# Patient Record
Sex: FEMALE | Race: WHITE | NOT HISPANIC OR LATINO | Employment: FULL TIME | URBAN - METROPOLITAN AREA
[De-identification: names, ages, dates, MRNs, and addresses within clinical notes are randomized per-mention and may not be internally consistent; named-entity substitution may affect disease eponyms.]

---

## 2017-12-14 ENCOUNTER — HOSPITAL ENCOUNTER (OUTPATIENT)
Dept: RADIOLOGY | Facility: HOSPITAL | Age: 77
Discharge: HOME | End: 2017-12-14
Attending: FAMILY MEDICINE
Payer: COMMERCIAL

## 2017-12-14 VITALS — WEIGHT: 200 LBS | BODY MASS INDEX: 37.76 KG/M2 | HEIGHT: 61 IN

## 2017-12-14 DIAGNOSIS — N63.22 BREAST LUMP ON LEFT SIDE AT 10 O'CLOCK POSITION: ICD-10-CM

## 2017-12-14 PROCEDURE — 76641 ULTRASOUND BREAST COMPLETE: CPT | Mod: TC,LT

## 2017-12-14 PROCEDURE — 77062 BREAST TOMOSYNTHESIS BI: CPT | Mod: 26,,, | Performed by: RADIOLOGY

## 2017-12-14 PROCEDURE — 77066 DX MAMMO INCL CAD BI: CPT | Mod: 26,,, | Performed by: RADIOLOGY

## 2017-12-14 PROCEDURE — 77062 BREAST TOMOSYNTHESIS BI: CPT | Mod: TC

## 2017-12-14 PROCEDURE — 76641 ULTRASOUND BREAST COMPLETE: CPT | Mod: 26,LT,, | Performed by: RADIOLOGY

## 2018-02-22 ENCOUNTER — HOSPITAL ENCOUNTER (OUTPATIENT)
Dept: RADIOLOGY | Facility: HOSPITAL | Age: 78
Discharge: HOME | End: 2018-02-22
Attending: FAMILY MEDICINE
Payer: COMMERCIAL

## 2018-02-22 DIAGNOSIS — Z78.0 POST-MENOPAUSAL: ICD-10-CM

## 2018-02-22 PROCEDURE — 77080 DXA BONE DENSITY AXIAL: CPT | Mod: TC

## 2018-02-22 PROCEDURE — 77080 DXA BONE DENSITY AXIAL: CPT | Mod: 26,,, | Performed by: RADIOLOGY

## 2018-04-09 ENCOUNTER — HOSPITAL ENCOUNTER (OUTPATIENT)
Dept: RADIOLOGY | Facility: HOSPITAL | Age: 78
Discharge: HOME | End: 2018-04-09
Attending: FAMILY MEDICINE
Payer: COMMERCIAL

## 2018-04-09 DIAGNOSIS — E04.1 NONTOXIC SINGLE THYROID NODULE: ICD-10-CM

## 2018-04-09 PROCEDURE — A9516 IODINE I-123 SOD IODIDE MIC: HCPCS

## 2018-04-09 PROCEDURE — 78014 THYROID IMAGING W/BLOOD FLOW: CPT | Mod: 26,,, | Performed by: RADIOLOGY

## 2018-04-10 ENCOUNTER — HOSPITAL ENCOUNTER (OUTPATIENT)
Dept: RADIOLOGY | Facility: HOSPITAL | Age: 78
Discharge: HOME | End: 2018-04-10
Attending: FAMILY MEDICINE
Payer: COMMERCIAL

## 2018-05-02 ENCOUNTER — HOSPITAL ENCOUNTER (OUTPATIENT)
Dept: RADIOLOGY | Facility: HOSPITAL | Age: 78
Discharge: HOME | End: 2018-05-02
Attending: FAMILY MEDICINE
Payer: COMMERCIAL

## 2018-05-02 DIAGNOSIS — E78.5 HYPERLIPIDEMIA: ICD-10-CM

## 2018-05-02 DIAGNOSIS — E08.9 DIABETES MELLITUS DUE TO UNDERLYING CONDITION: ICD-10-CM

## 2018-05-02 DIAGNOSIS — M54.50 LOW BACK PAIN: ICD-10-CM

## 2018-05-02 DIAGNOSIS — I10 HYPERTENSION GOAL BP (BLOOD PRESSURE) < 130/80: Primary | ICD-10-CM

## 2018-05-02 PROCEDURE — 25500020 PHARM REV CODE 255

## 2018-05-02 PROCEDURE — 74177 CT ABD & PELVIS W/CONTRAST: CPT | Mod: 26,,, | Performed by: RADIOLOGY

## 2018-05-02 PROCEDURE — 74177 CT ABD & PELVIS W/CONTRAST: CPT | Mod: TC

## 2018-05-02 RX ADMIN — IOHEXOL 30 ML: 350 INJECTION, SOLUTION INTRAVENOUS at 08:05

## 2018-05-02 RX ADMIN — IOHEXOL 100 ML: 350 INJECTION, SOLUTION INTRAVENOUS at 10:05

## 2018-10-15 ENCOUNTER — HOSPITAL ENCOUNTER (OUTPATIENT)
Dept: RADIOLOGY | Facility: HOSPITAL | Age: 78
Discharge: HOME | End: 2018-10-15
Attending: FAMILY MEDICINE
Payer: COMMERCIAL

## 2018-10-15 DIAGNOSIS — R10.33 PERIUMBILICAL PAIN: ICD-10-CM

## 2018-10-15 DIAGNOSIS — R51.9 HEADACHE: ICD-10-CM

## 2018-10-15 PROCEDURE — 70450 CT HEAD/BRAIN W/O DYE: CPT | Mod: TC

## 2018-10-15 PROCEDURE — 70450 CT HEAD/BRAIN W/O DYE: CPT | Mod: 26,,, | Performed by: RADIOLOGY

## 2018-10-15 PROCEDURE — 76700 US EXAM ABDOM COMPLETE: CPT | Mod: 26,,, | Performed by: RADIOLOGY

## 2018-10-15 PROCEDURE — 76700 US EXAM ABDOM COMPLETE: CPT | Mod: TC

## 2018-11-06 ENCOUNTER — OFFICE VISIT (OUTPATIENT)
Dept: NEUROLOGY | Facility: CLINIC | Age: 78
End: 2018-11-06
Payer: COMMERCIAL

## 2018-11-06 VITALS
BODY MASS INDEX: 49.05 KG/M2 | SYSTOLIC BLOOD PRESSURE: 116 MMHG | HEART RATE: 70 BPM | RESPIRATION RATE: 18 BRPM | HEIGHT: 58 IN | WEIGHT: 233.69 LBS | DIASTOLIC BLOOD PRESSURE: 70 MMHG

## 2018-11-06 DIAGNOSIS — I10 HYPERTENSION GOAL BP (BLOOD PRESSURE) < 130/80: ICD-10-CM

## 2018-11-06 DIAGNOSIS — R93.0 ABNORMAL CT OF THE HEAD: Primary | ICD-10-CM

## 2018-11-06 DIAGNOSIS — E78.2 MIXED HYPERLIPIDEMIA: ICD-10-CM

## 2018-11-06 PROCEDURE — 3078F DIAST BP <80 MM HG: CPT | Mod: CPTII,S$GLB,, | Performed by: PSYCHIATRY & NEUROLOGY

## 2018-11-06 PROCEDURE — 99204 OFFICE O/P NEW MOD 45 MIN: CPT | Mod: S$GLB,,, | Performed by: PSYCHIATRY & NEUROLOGY

## 2018-11-06 PROCEDURE — 99999 PR PBB SHADOW E&M-EST. PATIENT-LVL III: CPT | Mod: PBBFAC,,, | Performed by: PSYCHIATRY & NEUROLOGY

## 2018-11-06 PROCEDURE — 3074F SYST BP LT 130 MM HG: CPT | Mod: CPTII,S$GLB,, | Performed by: PSYCHIATRY & NEUROLOGY

## 2018-11-06 RX ORDER — ASPIRIN AND DIPYRIDAMOLE 25; 200 MG/1; MG/1
1 CAPSULE, EXTENDED RELEASE ORAL 2 TIMES DAILY
COMMUNITY
End: 2019-06-05

## 2018-11-06 RX ORDER — PYRIDOXINE HCL (VITAMIN B6) 50 MG
50 TABLET ORAL DAILY
Refills: 4 | COMMUNITY
Start: 2018-10-13

## 2018-11-06 RX ORDER — GLYBURIDE 1.25 MG/1
1.25 TABLET ORAL DAILY
Refills: 1 | COMMUNITY
Start: 2018-09-18

## 2018-11-06 RX ORDER — LEVOTHYROXINE SODIUM 150 UG/1
150 TABLET ORAL DAILY
Refills: 0 | COMMUNITY
Start: 2018-10-13

## 2018-11-06 RX ORDER — HYDROCHLOROTHIAZIDE 25 MG/1
1 TABLET ORAL DAILY
COMMUNITY

## 2018-11-06 NOTE — PROGRESS NOTES
"Consult from Dr CLAU Gamez    HPI: Aurora Go is a 77 y.o. female sent for evaluation of "cerebral ischemia" which was found by a CT scan done in 10/2018.  This was done due to a severe at that time in her which was unusual- she has not prior history of severe headaches.  She had head pain on right temple and bilateral occiput which lasted for several hours and was severe and required her to rest and persisted to the next day. She had no weakness or numbness. She is a Diabetic with HTN and dyslipidemia.   Headache resolved but she had CT head with PCP.  There is not family history of aneurysm.   She has a history of aortic valve repair with pig valve.   She was taking an 81mg ASA daily prior to this spell.   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 She lives in [place].     Review of Systems   HENT: Negative for nosebleeds.    Eyes: Negative for double vision.   Genitourinary: Negative for hematuria.   Neurological: Negative for seizures.   Psychiatric/Behavioral: The patient does not have insomnia.          I have reviewed all of this patient's past medical and surgical histories as well as family and social histories and active allergies and medications as documented in the electronic medical record.        Exam:  Gen Appearance, well developed/nourished in no apparent distress  CV: 2+ distal pulses with no edema or swelling  Neuro:  MS: Awake, alert, oriented to place, person, time, situation. Sustains attention. Recent/remote memory intact, Language is full to spontaneous speech/repetition/naming/comprehension. Fund of Knowledge is full  CN: Optic discs are flat with normal vasculature, PERRL, Extraoccular movements and visual fields are full. Normal facial sensation and strength, Hearing symmetric, Tongue and Palate are midline and strong. Shoulder Shrug symmetric and strong.  Motor: Normal bulk, tone, no abnormal movements. 5/5 strength bilateral upper/lower extremities " "with 2+ reflexes and bilateral plantar response  Sensory: symmetric to light touch, pain, temp, and vibration/proprioception. Romberg negative  Cerebellar: Finger-nose,Heal-shin, Rapid alternating movements intact  Gait: Normal stance, no ataxia    Imaging: 10/2015 CT head: Scattered remote areas of gliosis supratentorial both hemispheres, microvascular ischemic change favored.  No acute process or hemorrhage or subdural fluid or mass lesions.          Assessment/Plan: Aurora Go is a 77 y.o. female who had a headache in 10/2018 which is unusual for her. CT head showed "remote areas of gliosis" favoring microvascular ischemia changes.   I recommend:   1. MRI brain needed to further evaluate the above changes given her headache  2. Monitor for any recurrence headache.   3. If MRI does not show any acute changes, the white matter changes are likely chronic ischemic changes related to her HTN, DM2, and   Dyslipidemia. She was taking ASA daily prior to this headache. If there are no acute change by MRI, ASA 81mg could be continued (but she can be escalated to aggrenox and will need further work up if there is an acute CVA). For stroke prevention: Goal A1C is less than 7, goal LDL is less than 70 and goal BP is less than 130/90  Note: She has a history of aortic valve repair with pig valve.   The patient was asked to call for the above MRI results and notify me if any further headaches.   RTC given otherwise.   CC: Dr CLAU Gamez        "

## 2018-11-06 NOTE — LETTER
November 6, 2018      Clay Gamez MD  [address] [facility]  [place] [address]           [facility]  [address] [facility] [address]  Phone: [phone]  Fax: [phone]          Patient: Aurora Go   MR Number: [id number]   YOB: 1940   Date of Visit: 11/6/2018       Dear Dr. Clay Gamez:    Thank you for referring Aurora Go to me for evaluation. Attached you will find relevant portions of my assessment and plan of care.    If you have questions, please do not hesitate to call me. I look forward to following Aurora Go along with you.    Sincerely,    Henry Wise MD    Enclosure  CC:  No Recipients    If you would like to receive this communication electronically, please contact [email or web address] or [phone] to request more information on [employer] access.    For providers and/or their staff who would like to refer a patient to [facility], please contact us through our one-stop-shop provider referral line, [facility], at [phone].    If you feel you have received this communication in error or would no longer like to receive these types of communications, please e-mail [email or web address]

## 2018-11-13 ENCOUNTER — HOSPITAL ENCOUNTER (OUTPATIENT)
Dept: RADIOLOGY | Facility: HOSPITAL | Age: 78
Discharge: HOME | End: 2018-11-13
Attending: PSYCHIATRY & NEUROLOGY
Payer: COMMERCIAL

## 2018-11-13 DIAGNOSIS — R93.0 ABNORMAL CT OF THE HEAD: ICD-10-CM

## 2018-11-13 PROCEDURE — 70551 MRI BRAIN STEM W/O DYE: CPT | Mod: 26,,, | Performed by: RADIOLOGY

## 2018-11-13 PROCEDURE — 70551 MRI BRAIN STEM W/O DYE: CPT | Mod: TC

## 2019-05-02 ENCOUNTER — TELEPHONE (OUTPATIENT)
Dept: NEUROLOGY | Facility: CLINIC | Age: 79
End: 2019-05-02

## 2019-05-02 NOTE — TELEPHONE ENCOUNTER
Reviewed records sent by cardiology. Patient's updated Carotid US (less than 40% stenosis), 9 day event monitor (NSR), and Echo (normal EF) from cardiology in 2019 is reviewed and filed in the chart  Labs 2019: Normal A1C and LDL is 116

## 2019-06-05 ENCOUNTER — OFFICE VISIT (OUTPATIENT)
Dept: NEUROLOGY | Facility: CLINIC | Age: 79
End: 2019-06-05
Payer: COMMERCIAL

## 2019-06-05 VITALS
HEIGHT: 58 IN | DIASTOLIC BLOOD PRESSURE: 84 MMHG | BODY MASS INDEX: 50.17 KG/M2 | WEIGHT: 239 LBS | SYSTOLIC BLOOD PRESSURE: 128 MMHG | HEART RATE: 64 BPM | RESPIRATION RATE: 18 BRPM

## 2019-06-05 DIAGNOSIS — E03.9 HYPOTHYROIDISM, UNSPECIFIED TYPE: ICD-10-CM

## 2019-06-05 DIAGNOSIS — E78.2 MIXED HYPERLIPIDEMIA: ICD-10-CM

## 2019-06-05 DIAGNOSIS — R25.1 TREMOR OF LEFT HAND: ICD-10-CM

## 2019-06-05 DIAGNOSIS — R26.9 GAIT ABNORMALITY: ICD-10-CM

## 2019-06-05 DIAGNOSIS — R42 LIGHTHEADEDNESS: Primary | ICD-10-CM

## 2019-06-05 DIAGNOSIS — R51.9 HEADACHE, UNSPECIFIED HEADACHE TYPE: ICD-10-CM

## 2019-06-05 DIAGNOSIS — R53.83 FATIGUE, UNSPECIFIED TYPE: ICD-10-CM

## 2019-06-05 DIAGNOSIS — I63.81 LACUNAR INFARCTION: ICD-10-CM

## 2019-06-05 DIAGNOSIS — I10 HYPERTENSION GOAL BP (BLOOD PRESSURE) < 130/80: ICD-10-CM

## 2019-06-05 DIAGNOSIS — R20.2 FACIAL PARESTHESIA: ICD-10-CM

## 2019-06-05 DIAGNOSIS — H53.2 DIPLOPIA: ICD-10-CM

## 2019-06-05 PROCEDURE — 99215 PR OFFICE/OUTPT VISIT, EST, LEVL V, 40-54 MIN: ICD-10-PCS | Mod: S$GLB,,, | Performed by: NURSE PRACTITIONER

## 2019-06-05 PROCEDURE — 3079F PR MOST RECENT DIASTOLIC BLOOD PRESSURE 80-89 MM HG: ICD-10-PCS | Mod: CPTII,S$GLB,, | Performed by: NURSE PRACTITIONER

## 2019-06-05 PROCEDURE — 99215 OFFICE O/P EST HI 40 MIN: CPT | Mod: S$GLB,,, | Performed by: NURSE PRACTITIONER

## 2019-06-05 PROCEDURE — 99999 PR PBB SHADOW E&M-EST. PATIENT-LVL IV: CPT | Mod: PBBFAC,,, | Performed by: NURSE PRACTITIONER

## 2019-06-05 PROCEDURE — 3074F PR MOST RECENT SYSTOLIC BLOOD PRESSURE < 130 MM HG: ICD-10-PCS | Mod: CPTII,S$GLB,, | Performed by: NURSE PRACTITIONER

## 2019-06-05 PROCEDURE — 3074F SYST BP LT 130 MM HG: CPT | Mod: CPTII,S$GLB,, | Performed by: NURSE PRACTITIONER

## 2019-06-05 PROCEDURE — 3079F DIAST BP 80-89 MM HG: CPT | Mod: CPTII,S$GLB,, | Performed by: NURSE PRACTITIONER

## 2019-06-05 PROCEDURE — 99999 PR PBB SHADOW E&M-EST. PATIENT-LVL IV: ICD-10-PCS | Mod: PBBFAC,,, | Performed by: NURSE PRACTITIONER

## 2019-06-05 RX ORDER — RAMIPRIL 5 MG/1
5 CAPSULE ORAL DAILY
Refills: 0 | COMMUNITY
Start: 2019-05-02

## 2019-06-05 NOTE — PROGRESS NOTES
"HPI: Aurora Go is a 78 y.o. female sent for evaluation of "cerebral ischemia" which was found by a CT scan done in 10/2018 for severe headache without history of headaches. MRI Brain, which followed showed a remote lacunar-type infarction in the left cerebellar hemisphere, but no acute infarcts. She has DM, HTN, HLD, hypothyroidism, and history of aortic valve repair with pig valve.    She presents today with multiple complaints:   She has lightheadedness, which began two weeks ago. This occurs independent of position. No room spinning sensation, and she denies presyncopal complaints. The lightheadedness is subtle. This last for 15 seconds at a time, and has only occurred a couple times.     She has had two episodes of gait imbalance in the past two weeks, which are not associated with lightheadedness. She was standing up in the kitchen and felt as if she was tilting toward her left side. She did not trust herself to walk for 5-10 seconds, then she was fine.     She reports to having episodes of left lip numbness, which began in 11/2018 (though this was not disclosed at her last visit). This used to occur once per week or once every two weeks, but has occurred twice in the past week. This lasts for 15 seconds at a time.     She has had two headaches this past month, which were located to the left occipital area, and radiated to the left frontal area. Sleep did help her headaches, but did not resolve it. Headache lasted for 2 1/2 hours. Tylenol does help to abort her headaches.     She reports to having a stressful past two years. Denies anxiety/depression. 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     She has felt generally weak and tired for the past 2-3 months. Before this, she was walking a few miles each day, and now has poor tolerance of physical activity.    A couple days ago, she was looking at the television, and thought that she saw two lines, but when " she walked up to the television there was only one line.      She ran out of her HCTZ a few days ago, and has not been taking this. Increased BLE swelling since then. She will be seeing her PCP this week.     Review of Systems   Constitutional: Positive for malaise/fatigue.   HENT: Negative for nosebleeds.    Eyes: Positive for double vision.   Respiratory: Negative for shortness of breath.    Cardiovascular: Positive for leg swelling.   Gastrointestinal: Negative for blood in stool.   Genitourinary: Negative for hematuria.   Musculoskeletal: Negative for falls.   Skin: Negative for rash.   Neurological: Positive for dizziness, tingling, sensory change, speech change, weakness and headaches. Negative for focal weakness, seizures and loss of consciousness.   Psychiatric/Behavioral: Negative for depression and memory loss. The patient is not nervous/anxious and does not have insomnia.        I have reviewed all of this patient's past medical and surgical histories as well as family and social histories and active allergies and medications as documented in the electronic medical record.    Exam:  Gen Appearance, well developed/nourished in no apparent distress  CV: 2+ distal pulses with 2+ pitting edema to BLE  Neuro:  MS: Awake, alert, oriented to place, person, time, situation. Sustains attention. Recent/remote memory intact, Language is full to spontaneous speech/repetition/naming/comprehension. Fund of Knowledge is full  CN: Optic discs are flat with normal vasculature, PERRL, Extraoccular movements and visual fields are full. Normal facial sensation and strength, Hearing symmetric, Tongue and Palate are midline and strong. Shoulder Shrug symmetric and strong.  Motor: Normal bulk, tone, no resting tremor, but there is a high frequency tremor of the outstretched left hand that his fleeting; 5/5 strength bilateral upper/lower extremities with 2+ reflexes and bilateral plantar response  Sensory: symmetric to light  "touch, pain, temp, and vibration/proprioception. Romberg negative  Cerebellar: Finger-nose,Heal-shin, Rapid alternating movements intact  Gait: Normal stance, no ataxia    Imagin/2018 MRI Brain:   FINDINGS:  Age-appropriate generalized cerebral volume loss.  No abnormal diffusion restriction to suggest an acute infarction.  No abnormal gradient susceptibility.  Patchy regions of T2/FLAIR signal abnormality in the supratentorial white matter in keeping with chronic microvascular ischemic disease of a mild-to-moderate degree.  Remote lacunar-type infarction in the left cerebellar hemisphere.  Empty sella turcica configuration.  The ventricles are normal in size and configuration without hydrocephalus.  No extra-axial fluid collections.  Prominent fluid signal about the optic nerve sheaths bilaterally.    Expected intracranial flow voids are demonstrated.  The visualized paranasal sinuses including the mastoid air cells are clear.    Age-appropriate generalized cerebral volume loss with mild moderate chronic microvascular ischemic change.  No evidence for an acute infarction.    Empty sella turcica configuration with prominent fluid about the optic nerve sheaths bilaterally.  This can be seen the setting of pseudotumor cerebri.  Clinical correlation suggested.    10/2015 CT head: Scattered remote areas of gliosis supratentorial both hemispheres, microvascular ischemic change favored.  No acute process or hemorrhage or subdural fluid or mass lesions.    Reviewed records sent by cardiology. Patient's updated Carotid US (less than 40% stenosis), 9 day event monitor (NSR), and Echo (normal EF) from cardiology in 2019 is reviewed and filed in the chart  Labs 2019: Normal A1C and LDL is 116    Assessment/Plan: Aurora Go is a 78 y.o. female who had a headache in 10/2018 which was unusual for her. CT head showed "remote areas of gliosis" favoring microvascular ischemia.  MRI Brain, which followed showed a " remote lacunar-type infarction in the left cerebellar hemisphere, but no acute infarcts. She has DM, HTN, HLD, and history of aortic valve repair with pig valve.      Now with episodes of lightheadedness, episodes of gait imbalance, episodes of left lip paresthesias, severe fatigue, general weakness, and possible single occurrence of diplopia.   I recommend:   1. MRI Brain to rule out additional CVA or other IC causes of her complaints. Neuro exam is unremarkable today, other than fleeting high frequency action tremor to the left hand.   2. CBC, CMP, TSH, T4, B12, Vitamin D, ESR, MG antibodies.   3. Have updated eye exam with Ophthalmology to evaluate for cause of diplopia complaint.   4. If above is unremarkable, I would consider a 30 day cardiac event monitor. Her 9 day monitor from [facility] was unremarkable.   5. Prior remote lacunar infarct likely related to risk factors-HTN, HLD, DM.   6. Continue ASA 81 mg for vascular protection, as well as statin and anti-HTN medications.   7. For stroke prevention: Goal A1C is less than 7, goal LDL is less than 70 and goal BP is less than 130/90    FU 2 months    CC: Dr CLAU Gamez

## 2019-06-05 NOTE — PATIENT INSTRUCTIONS
Please have an updated eye exam with an ophthalmologist, and have them send the notes to this office.

## 2019-06-12 ENCOUNTER — HOSPITAL ENCOUNTER (OUTPATIENT)
Dept: RADIOLOGY | Facility: HOSPITAL | Age: 79
Discharge: HOME | End: 2019-06-12
Attending: NURSE PRACTITIONER
Payer: COMMERCIAL

## 2019-06-12 DIAGNOSIS — I63.81 LACUNAR INFARCTION: ICD-10-CM

## 2019-06-12 DIAGNOSIS — R42 LIGHTHEADEDNESS: ICD-10-CM

## 2019-06-12 DIAGNOSIS — R20.2 FACIAL PARESTHESIA: ICD-10-CM

## 2019-06-12 DIAGNOSIS — R25.1 TREMOR OF LEFT HAND: ICD-10-CM

## 2019-06-12 DIAGNOSIS — R26.9 GAIT ABNORMALITY: ICD-10-CM

## 2019-06-12 DIAGNOSIS — R51.9 HEADACHE, UNSPECIFIED HEADACHE TYPE: ICD-10-CM

## 2019-06-12 PROCEDURE — 70551 MRI BRAIN STEM W/O DYE: CPT | Mod: TC

## 2019-06-12 PROCEDURE — 70551 MRI BRAIN WITHOUT CONTRAST: ICD-10-PCS | Mod: 26,,, | Performed by: RADIOLOGY

## 2019-06-12 PROCEDURE — 70551 MRI BRAIN STEM W/O DYE: CPT | Mod: 26,,, | Performed by: RADIOLOGY

## 2019-07-17 ENCOUNTER — TELEPHONE (OUTPATIENT)
Dept: NEUROLOGY | Facility: CLINIC | Age: 79
End: 2019-07-17

## 2019-07-17 NOTE — TELEPHONE ENCOUNTER
----- Message from Suze Carson MA sent at 2019  9:58 AM CDT -----  Contact: self      ----- Message -----  From: Ximena Nathan  Sent: 2019   9:36 AM  To: [facility] Staff    Aurora Go  MRN: [id number]  : 1940  PCP: Clay Gamez  Home Phone      [phone]  Work Phone      Not on file.  Mobile          [phone]      MESSAGE:  Pt states she never received her blood work results that she took at 's office. Pt states she had it done there after Mrs. Davis sent her there to get it done. Please advise, thanks.  Phone: [phone]

## 2019-07-25 ENCOUNTER — TELEPHONE (OUTPATIENT)
Dept: NEUROLOGY | Facility: CLINIC | Age: 79
End: 2019-07-25

## 2019-07-25 NOTE — TELEPHONE ENCOUNTER
Please inform patient that labs are unremarkable. I did not receive her labs until this week. When they are done at an outside facility, we have to wait until the outside facility sends the results to us, and sometimes they do not send them.

## 2024-12-01 ENCOUNTER — HOSPITAL ENCOUNTER (INPATIENT)
Facility: HOSPITAL | Age: 84
LOS: 3 days | Discharge: HOME HEALTH | DRG: 069 | End: 2024-12-04
Attending: FAMILY MEDICINE | Admitting: FAMILY MEDICINE
Payer: COMMERCIAL

## 2024-12-01 DIAGNOSIS — I63.9 STROKE: ICD-10-CM

## 2024-12-01 DIAGNOSIS — G45.9 TIA (TRANSIENT ISCHEMIC ATTACK): ICD-10-CM

## 2024-12-01 DIAGNOSIS — I65.22 MORE THAN 50 PERCENT STENOSIS OF LEFT INTERNAL CAROTID ARTERY: Primary | ICD-10-CM

## 2024-12-01 DIAGNOSIS — E78.5 DYSLIPIDEMIA: ICD-10-CM

## 2024-12-01 LAB
CHOLEST SERPL-MCNC: 243 MG/DL (ref 120–199)
CHOLEST/HDLC SERPL: 5.9 {RATIO} (ref 2–5)
CREAT SERPL-MCNC: 1.1 MG/DL (ref 0.5–1.4)
EST. GFR  (NO RACE VARIABLE): 50 ML/MIN/1.73 M^2
ESTIMATED AVG GLUCOSE: 128 MG/DL (ref 68–131)
GLUCOSE SERPL-MCNC: 115 MG/DL (ref 70–110)
HBA1C MFR BLD: 6.1 % (ref 4–5.6)
HDLC SERPL-MCNC: 41 MG/DL (ref 40–75)
HDLC SERPL: 16.9 % (ref 20–50)
LDLC SERPL CALC-MCNC: 153.8 MG/DL (ref 63–159)
NONHDLC SERPL-MCNC: 202 MG/DL
POCT GLUCOSE: 102 MG/DL (ref 70–110)
T4 FREE SERPL-MCNC: 1.18 NG/DL (ref 0.71–1.51)
TRIGL SERPL-MCNC: 241 MG/DL (ref 30–150)
TSH SERPL DL<=0.005 MIU/L-ACNC: 0.1 UIU/ML (ref 0.4–4)

## 2024-12-01 PROCEDURE — 82565 ASSAY OF CREATININE: CPT | Performed by: FAMILY MEDICINE

## 2024-12-01 PROCEDURE — 84439 ASSAY OF FREE THYROXINE: CPT | Performed by: INTERNAL MEDICINE

## 2024-12-01 PROCEDURE — 36415 COLL VENOUS BLD VENIPUNCTURE: CPT | Performed by: FAMILY MEDICINE

## 2024-12-01 PROCEDURE — 84443 ASSAY THYROID STIM HORMONE: CPT | Performed by: INTERNAL MEDICINE

## 2024-12-01 PROCEDURE — 11000001 HC ACUTE MED/SURG PRIVATE ROOM

## 2024-12-01 PROCEDURE — 80061 LIPID PANEL: CPT | Performed by: INTERNAL MEDICINE

## 2024-12-01 PROCEDURE — 94761 N-INVAS EAR/PLS OXIMETRY MLT: CPT

## 2024-12-01 PROCEDURE — 36415 COLL VENOUS BLD VENIPUNCTURE: CPT | Performed by: INTERNAL MEDICINE

## 2024-12-01 PROCEDURE — 83036 HEMOGLOBIN GLYCOSYLATED A1C: CPT | Performed by: INTERNAL MEDICINE

## 2024-12-01 RX ORDER — BISACODYL 10 MG/1
10 SUPPOSITORY RECTAL DAILY PRN
Status: DISCONTINUED | OUTPATIENT
Start: 2024-12-01 | End: 2024-12-04 | Stop reason: HOSPADM

## 2024-12-01 RX ORDER — ENOXAPARIN SODIUM 100 MG/ML
40 INJECTION SUBCUTANEOUS EVERY 24 HOURS
Status: DISCONTINUED | OUTPATIENT
Start: 2024-12-02 | End: 2024-12-01

## 2024-12-01 RX ORDER — ASPIRIN 81 MG/1
81 TABLET ORAL DAILY
Status: DISCONTINUED | OUTPATIENT
Start: 2024-12-02 | End: 2024-12-02

## 2024-12-01 RX ORDER — ENOXAPARIN SODIUM 100 MG/ML
40 INJECTION SUBCUTANEOUS EVERY 24 HOURS
Status: DISCONTINUED | OUTPATIENT
Start: 2024-12-02 | End: 2024-12-04 | Stop reason: HOSPADM

## 2024-12-01 RX ORDER — ATORVASTATIN CALCIUM 40 MG/1
40 TABLET, FILM COATED ORAL DAILY
Status: DISCONTINUED | OUTPATIENT
Start: 2024-12-02 | End: 2024-12-04 | Stop reason: HOSPADM

## 2024-12-01 RX ORDER — SODIUM CHLORIDE 0.9 % (FLUSH) 0.9 %
10 SYRINGE (ML) INJECTION
Status: DISCONTINUED | OUTPATIENT
Start: 2024-12-01 | End: 2024-12-04 | Stop reason: HOSPADM

## 2024-12-02 PROBLEM — G45.9 TIA (TRANSIENT ISCHEMIC ATTACK): Status: ACTIVE | Noted: 2024-12-02

## 2024-12-02 LAB
ALBUMIN SERPL BCP-MCNC: 3.1 G/DL (ref 3.5–5.2)
ALP SERPL-CCNC: 81 U/L (ref 40–150)
ALT SERPL W/O P-5'-P-CCNC: 8 U/L (ref 10–44)
ANION GAP SERPL CALC-SCNC: 11 MMOL/L (ref 8–16)
APICAL FOUR CHAMBER EJECTION FRACTION: 71 %
APICAL TWO CHAMBER EJECTION FRACTION: 71 %
APTT PPP: 27.9 SEC (ref 21–32)
ASCENDING AORTA: 3.83 CM
AST SERPL-CCNC: 16 U/L (ref 10–40)
AV INDEX (PROSTH): 0.26
AV MEAN GRADIENT: 16.3 MMHG
AV PEAK GRADIENT: 25 MMHG
AV VALVE AREA BY VELOCITY RATIO: 1.3 CM²
AV VALVE AREA: 1.1 CM²
AV VELOCITY RATIO: 0.32
BASOPHILS # BLD AUTO: 0.06 K/UL (ref 0–0.2)
BASOPHILS NFR BLD: 0.7 % (ref 0–1.9)
BILIRUB SERPL-MCNC: 0.6 MG/DL (ref 0.1–1)
BSA FOR ECHO PROCEDURE: 2.07 M2
BUN SERPL-MCNC: 21 MG/DL (ref 8–23)
CALCIUM SERPL-MCNC: 9.2 MG/DL (ref 8.7–10.5)
CHLORIDE SERPL-SCNC: 107 MMOL/L (ref 95–110)
CO2 SERPL-SCNC: 22 MMOL/L (ref 23–29)
CREAT SERPL-MCNC: 1 MG/DL (ref 0.5–1.4)
CV ECHO LV RWT: 0.59 CM
DIFFERENTIAL METHOD BLD: NORMAL
DOP CALC AO PEAK VEL: 2.5 M/S
DOP CALC AO VTI: 53.9 CM
DOP CALC LVOT AREA: 4.2 CM2
DOP CALC LVOT DIAMETER: 2.3 CM
DOP CALC LVOT PEAK VEL: 0.8 M/S
DOP CALC LVOT STROKE VOLUME: 57.7 CM3
DOP CALC MV VTI: 35.4 CM
DOP CALCLVOT PEAK VEL VTI: 13.9 CM
E/E' RATIO: 11.83 M/S
ECHO LV POSTERIOR WALL: 1.2 CM (ref 0.6–1.1)
EOSINOPHIL # BLD AUTO: 0.3 K/UL (ref 0–0.5)
EOSINOPHIL NFR BLD: 3 % (ref 0–8)
ERYTHROCYTE [DISTWIDTH] IN BLOOD BY AUTOMATED COUNT: 13.1 % (ref 11.5–14.5)
EST. GFR  (NO RACE VARIABLE): 56 ML/MIN/1.73 M^2
FRACTIONAL SHORTENING: 24.4 % (ref 28–44)
GLUCOSE SERPL-MCNC: 101 MG/DL (ref 70–110)
HCT VFR BLD AUTO: 39.3 % (ref 37–48.5)
HGB BLD-MCNC: 12.8 G/DL (ref 12–16)
HR MV ECHO: 100 BPM
IMM GRANULOCYTES # BLD AUTO: 0.03 K/UL (ref 0–0.04)
IMM GRANULOCYTES NFR BLD AUTO: 0.4 % (ref 0–0.5)
INR PPP: 1.1 (ref 0.8–1.2)
INTERVENTRICULAR SEPTUM: 1.2 CM (ref 0.6–1.1)
IVC DIAMETER: 1.7 CM
LEFT ATRIUM AREA SYSTOLIC (APICAL 2 CHAMBER): 18.77 CM2
LEFT ATRIUM AREA SYSTOLIC (APICAL 4 CHAMBER): 19.42 CM2
LEFT ATRIUM VOLUME INDEX MOD: 26.7 ML/M2
LEFT ATRIUM VOLUME MOD: 51.8 ML
LEFT INTERNAL DIMENSION IN SYSTOLE: 3.1 CM (ref 2.1–4)
LEFT VENTRICLE DIASTOLIC VOLUME INDEX: 37.95 ML/M2
LEFT VENTRICLE DIASTOLIC VOLUME: 73.63 ML
LEFT VENTRICLE END DIASTOLIC VOLUME APICAL 2 CHAMBER: 31.62 ML
LEFT VENTRICLE END DIASTOLIC VOLUME APICAL 4 CHAMBER: 38.82 ML
LEFT VENTRICLE END SYSTOLIC VOLUME APICAL 2 CHAMBER: 51.12 ML
LEFT VENTRICLE END SYSTOLIC VOLUME APICAL 4 CHAMBER: 48.76 ML
LEFT VENTRICLE MASS INDEX: 88.5 G/M2
LEFT VENTRICLE SYSTOLIC VOLUME INDEX: 19 ML/M2
LEFT VENTRICLE SYSTOLIC VOLUME: 36.81 ML
LEFT VENTRICULAR INTERNAL DIMENSION IN DIASTOLE: 4.1 CM (ref 3.5–6)
LEFT VENTRICULAR MASS: 171.7 G
LV LATERAL E/E' RATIO: 11.33 M/S
LV SEPTAL E/E' RATIO: 12.36 M/S
LVED V (TEICH): 73.63 ML
LVES V (TEICH): 36.81 ML
LVOT MG: 1.54 MMHG
LVOT MV: 0.6 CM/S
LYMPHOCYTES # BLD AUTO: 2.8 K/UL (ref 1–4.8)
LYMPHOCYTES NFR BLD: 32.7 % (ref 18–48)
MAGNESIUM SERPL-MCNC: 2 MG/DL (ref 1.6–2.6)
MCH RBC QN AUTO: 30 PG (ref 27–31)
MCHC RBC AUTO-ENTMCNC: 32.6 G/DL (ref 32–36)
MCV RBC AUTO: 92 FL (ref 82–98)
MONOCYTES # BLD AUTO: 0.7 K/UL (ref 0.3–1)
MONOCYTES NFR BLD: 8.4 % (ref 4–15)
MV MEAN GRADIENT: 5 MMHG
MV PEAK E VEL: 1.36 M/S
MV PEAK GRADIENT: 14 MMHG
MV VALVE AREA BY CONTINUITY EQUATION: 1.63 CM2
NEUTROPHILS # BLD AUTO: 4.7 K/UL (ref 1.8–7.7)
NEUTROPHILS NFR BLD: 54.8 % (ref 38–73)
NRBC BLD-RTO: 0 /100 WBC
OHS CV RV/LV RATIO: 0.78 CM
OHS LV EJECTION FRACTION SIMPSONS BIPLANE MOD: 71 %
PHOSPHATE SERPL-MCNC: 3.3 MG/DL (ref 2.7–4.5)
PISA TR MAX VEL: 2.53 M/S
PLATELET # BLD AUTO: 243 K/UL (ref 150–450)
PMV BLD AUTO: 10.6 FL (ref 9.2–12.9)
POCT GLUCOSE: 122 MG/DL (ref 70–110)
POCT GLUCOSE: 148 MG/DL (ref 70–110)
POCT GLUCOSE: 165 MG/DL (ref 70–110)
POTASSIUM SERPL-SCNC: 4 MMOL/L (ref 3.5–5.1)
PROT SERPL-MCNC: 6.7 G/DL (ref 6–8.4)
PROTHROMBIN TIME: 11.7 SEC (ref 9–12.5)
PV MV: 0.7 M/S
PV PEAK GRADIENT: 5 MMHG
PV PEAK VELOCITY: 1.09 M/S
RA PRESSURE ESTIMATED: 3 MMHG
RA VOL SYS: 24.91 ML
RBC # BLD AUTO: 4.26 M/UL (ref 4–5.4)
RIGHT ATRIAL AREA: 12 CM2
RIGHT ATRIUM VOLUME AREA LENGTH APICAL 4 CHAMBER: 24.13 ML
RIGHT VENTRICLE DIASTOLIC BASEL DIMENSION: 3.2 CM
RV TB RVSP: 6 MMHG
RV TISSUE DOPPLER FREE WALL SYSTOLIC VELOCITY 1 (APICAL 4 CHAMBER VIEW): 11.23 CM/S
SODIUM SERPL-SCNC: 140 MMOL/L (ref 136–145)
TDI LATERAL: 0.12 M/S
TDI SEPTAL: 0.11 M/S
TDI: 0.12 M/S
TR MAX PG: 26 MMHG
TROPONIN I SERPL DL<=0.01 NG/ML-MCNC: 0.01 NG/ML (ref 0–0.03)
TV REST PULMONARY ARTERY PRESSURE: 29 MMHG
WBC # BLD AUTO: 8.57 K/UL (ref 3.9–12.7)
Z-SCORE OF LEFT VENTRICULAR DIMENSION IN END DIASTOLE: -2.96
Z-SCORE OF LEFT VENTRICULAR DIMENSION IN END SYSTOLE: -0.71

## 2024-12-02 PROCEDURE — 84100 ASSAY OF PHOSPHORUS: CPT | Performed by: INTERNAL MEDICINE

## 2024-12-02 PROCEDURE — 99900035 HC TECH TIME PER 15 MIN (STAT)

## 2024-12-02 PROCEDURE — 25500020 PHARM REV CODE 255: Performed by: STUDENT IN AN ORGANIZED HEALTH CARE EDUCATION/TRAINING PROGRAM

## 2024-12-02 PROCEDURE — 97161 PT EVAL LOW COMPLEX 20 MIN: CPT

## 2024-12-02 PROCEDURE — 63600175 PHARM REV CODE 636 W HCPCS: Performed by: FAMILY MEDICINE

## 2024-12-02 PROCEDURE — 94760 N-INVAS EAR/PLS OXIMETRY 1: CPT

## 2024-12-02 PROCEDURE — 92610 EVALUATE SWALLOWING FUNCTION: CPT

## 2024-12-02 PROCEDURE — 97165 OT EVAL LOW COMPLEX 30 MIN: CPT

## 2024-12-02 PROCEDURE — 25000003 PHARM REV CODE 250: Performed by: INTERNAL MEDICINE

## 2024-12-02 PROCEDURE — 25500020 PHARM REV CODE 255: Performed by: INTERNAL MEDICINE

## 2024-12-02 PROCEDURE — 80053 COMPREHEN METABOLIC PANEL: CPT | Performed by: INTERNAL MEDICINE

## 2024-12-02 PROCEDURE — 85025 COMPLETE CBC W/AUTO DIFF WBC: CPT | Performed by: INTERNAL MEDICINE

## 2024-12-02 PROCEDURE — 97530 THERAPEUTIC ACTIVITIES: CPT

## 2024-12-02 PROCEDURE — 97535 SELF CARE MNGMENT TRAINING: CPT

## 2024-12-02 PROCEDURE — 11000001 HC ACUTE MED/SURG PRIVATE ROOM

## 2024-12-02 PROCEDURE — 94761 N-INVAS EAR/PLS OXIMETRY MLT: CPT

## 2024-12-02 PROCEDURE — 83735 ASSAY OF MAGNESIUM: CPT | Performed by: INTERNAL MEDICINE

## 2024-12-02 PROCEDURE — 36415 COLL VENOUS BLD VENIPUNCTURE: CPT | Performed by: INTERNAL MEDICINE

## 2024-12-02 PROCEDURE — 25000003 PHARM REV CODE 250: Performed by: STUDENT IN AN ORGANIZED HEALTH CARE EDUCATION/TRAINING PROGRAM

## 2024-12-02 PROCEDURE — 85730 THROMBOPLASTIN TIME PARTIAL: CPT | Performed by: INTERNAL MEDICINE

## 2024-12-02 PROCEDURE — 84484 ASSAY OF TROPONIN QUANT: CPT | Performed by: INTERNAL MEDICINE

## 2024-12-02 PROCEDURE — 85610 PROTHROMBIN TIME: CPT | Performed by: INTERNAL MEDICINE

## 2024-12-02 RX ORDER — HYDROCHLOROTHIAZIDE 25 MG/1
25 TABLET ORAL DAILY
Status: DISCONTINUED | OUTPATIENT
Start: 2024-12-02 | End: 2024-12-04 | Stop reason: HOSPADM

## 2024-12-02 RX ORDER — MICONAZOLE NITRATE 2 G/100G
POWDER TOPICAL 2 TIMES DAILY
Status: DISCONTINUED | OUTPATIENT
Start: 2024-12-02 | End: 2024-12-04 | Stop reason: HOSPADM

## 2024-12-02 RX ORDER — GLUCAGON 1 MG
1 KIT INJECTION
Status: DISCONTINUED | OUTPATIENT
Start: 2024-12-02 | End: 2024-12-04 | Stop reason: HOSPADM

## 2024-12-02 RX ORDER — INSULIN ASPART 100 [IU]/ML
0-5 INJECTION, SOLUTION INTRAVENOUS; SUBCUTANEOUS
Status: DISCONTINUED | OUTPATIENT
Start: 2024-12-02 | End: 2024-12-04 | Stop reason: HOSPADM

## 2024-12-02 RX ORDER — IBUPROFEN 200 MG
16 TABLET ORAL
Status: DISCONTINUED | OUTPATIENT
Start: 2024-12-02 | End: 2024-12-04 | Stop reason: HOSPADM

## 2024-12-02 RX ORDER — LISINOPRIL 5 MG/1
5 TABLET ORAL DAILY
Status: DISCONTINUED | OUTPATIENT
Start: 2024-12-02 | End: 2024-12-04 | Stop reason: HOSPADM

## 2024-12-02 RX ORDER — CLOPIDOGREL BISULFATE 75 MG/1
75 TABLET ORAL DAILY
Status: DISCONTINUED | OUTPATIENT
Start: 2024-12-03 | End: 2024-12-04 | Stop reason: HOSPADM

## 2024-12-02 RX ORDER — IBUPROFEN 200 MG
24 TABLET ORAL
Status: DISCONTINUED | OUTPATIENT
Start: 2024-12-02 | End: 2024-12-04 | Stop reason: HOSPADM

## 2024-12-02 RX ORDER — LEVOTHYROXINE SODIUM 75 UG/1
150 TABLET ORAL
Status: DISCONTINUED | OUTPATIENT
Start: 2024-12-02 | End: 2024-12-04 | Stop reason: HOSPADM

## 2024-12-02 RX ADMIN — ATORVASTATIN CALCIUM 40 MG: 40 TABLET, FILM COATED ORAL at 10:12

## 2024-12-02 RX ADMIN — LEVOTHYROXINE SODIUM 150 MCG: 75 TABLET ORAL at 05:12

## 2024-12-02 RX ADMIN — HYDROCHLOROTHIAZIDE 25 MG: 25 TABLET ORAL at 10:12

## 2024-12-02 RX ADMIN — ASPIRIN 81 MG: 81 TABLET, COATED ORAL at 10:12

## 2024-12-02 RX ADMIN — PERFLUTREN 1 ML: 6.52 INJECTION, SUSPENSION INTRAVENOUS at 03:12

## 2024-12-02 RX ADMIN — ENOXAPARIN SODIUM 40 MG: 40 INJECTION SUBCUTANEOUS at 04:12

## 2024-12-02 RX ADMIN — MICONAZOLE NITRATE: 20 POWDER TOPICAL at 09:12

## 2024-12-02 RX ADMIN — MICONAZOLE NITRATE: 20 POWDER TOPICAL at 10:12

## 2024-12-02 RX ADMIN — IOHEXOL 100 ML: 350 INJECTION, SOLUTION INTRAVENOUS at 04:12

## 2024-12-02 NOTE — PT/OT/SLP EVAL
Occupational Therapy   Evaluation    Name: Aurora Go  MRN: [id number]  Admitting Diagnosis: TIA (transient ischemic attack)  Recent Surgery: * No surgery found *      Recommendations:     Discharge Recommendations:  (TBD)  Discharge Equipment Recommendations:  other (see comments) (TBD)  Barriers to discharge:  Decreased caregiver support, Other (Comment) (Pt requires increased level of assist)    Assessment:     Aurora Go is a 83 y.o. female with a medical diagnosis of TIA (transient ischemic attack).  She presents with Diagnoses of Stroke and TIA (transient ischemic attack) were pertinent to this visit. Performance deficits affecting function: weakness, impaired endurance, gait instability, impaired functional mobility, impaired balance, impaired self care skills.      Rehab Prognosis: Good; patient would benefit from acute skilled OT services to address these deficits and reach maximum level of function.       Plan:     Patient to be seen 3 x/week to address the above listed problems via self-care/home management, therapeutic activities, therapeutic exercises  Plan of Care Expires: 01/02/25  Plan of Care Reviewed with: patient    Subjective     Chief Complaint: lightheadedness  Patient/Family Comments/goals: return to PLOF    Occupational Profile:  Living Environment: Pt lives alone, SSH, ramp, WIS w/TTB  Previous level of function: Zoe with SPC PRN, wear slip on shoes at home  Roles and Routines: Not driving  Equipment Used at Home: walker, rolling, bedside commode, wheelchair, cane, straight, bath bench  Assistance upon Discharge: Daughters who live nearby    Pain/Comfort:  Pain Rating 1: 0/10    Patients cultural, spiritual, Muslim conflicts given the current situation: no    Objective:     Communicated with: nsg prior to session.  Patient found HOB elevated with bed alarm, telemetry upon OT entry to room.    General Precautions: Standard, fall  Orthopedic Precautions: N/A  Braces:  "N/A  Respiratory Status: Room air    Occupational Performance:    Bed Mobility:    Patient completed Scooting/Bridging with stand by assistance  Patient completed Supine to Sit with stand by assistance  Patient completed Sit to Supine with contact guard assistance    Functional Mobility/Transfers:  Patient completed Sit <> Stand Transfer with contact guard assistance  with  rolling walker   Functional Mobility: side steps to HOB w/CGA -Katharine & use of RW    Activities of Daily Living:  Lower Body Dressing: maximal assistance to wanda B socks ; pt states she wears slip on shoes at baseline.     Cognitive/Visual Perceptual:  Cognitive/Psychosocial Skills:     -       Oriented to: Person, Place, Time, and Situation   -       Memory: No Deficits noted  -       Mood/Affect/Coping skills/emotional control: Cooperative and Pleasant    Physical Exam:  Sensation:    -       Intact  Upper Extremity Range of Motion:     -       Right Upper Extremity: WFL  -       Left Upper Extremity: WFL  Upper Extremity Strength:    -       Right Upper Extremity: WFL  -       Left Upper Extremity: WFL   Strength:    -       Right Upper Extremity: WFL  -       Left Upper Extremity: WFL  Fine Motor Coordination:    -       Intact  Left hand thumb/finger opposition skills, Right hand thumb/finger opposition skills, Left hand, diadochokinesis skill , and Right hand, diadochokinesis skill     AMPAC 6 Click ADL:  AMPAC Total Score: 18    Treatment & Education:  Pt would benefit from cont OT services in order to maximize functional independence.   Pt limited this date by c/o lightheadedness EOB & OOB.   See below note for BP chart, no orthostatic hypotension noted.   Pt reports feeling "like I almost passed out" on return to supine; nsg notified.   No deficits noted UE ROM/strength/sensation/coordination.   Will progress as able.     Supine: 131/76,   EOB: 132/78,   Stand: 143/72,     Patient left HOB elevated with all lines " intact, call button in reach, bed alarm on, and nsg notified    GOALS:   Multidisciplinary Problems       Occupational Therapy Goals          Problem: Occupational Therapy    Goal Priority Disciplines Outcome Interventions   Occupational Therapy Goal     OT, PT/OT Progressing    Description: Goals to be met by: 01/02/2024     Patient will increase functional independence with ADLs by performing:    UE Dressing with Modified [place].  Grooming while standing with Modified [place].  Toileting from toilet with Modified [place] for hygiene and clothing management.   Step transfer with Modified [place]  Toilet transfer to toilet with Modified [place].                         History:     Past Medical History:   Diagnosis Date    Diabetes mellitus     Dyslipidemia     HTN (hypertension)     hypothyroidism     Morbid obesity     Stenosis          Past Surgical History:   Procedure Laterality Date    CARDIAC VALVE SURGERY  2/19/2012    HYSTERECTOMY  1986    TONSILLECTOMY, ADENOIDECTOMY  1946       Time Tracking:     OT Date of Treatment: 12/02/24  OT Start Time: 1000  OT Stop Time: 1031  OT Total Time (min): 31 min w/PT    Billable Minutes:Evaluation 8  Therapeutic Activity 23    12/2/2024

## 2024-12-02 NOTE — PT/OT/SLP EVAL
"Physical Therapy Evaluation    Patient Name:  Aurora Go   MRN:  [id number]    Recommendations:     Discharge Recommendations:  (TBD pending progress)   Discharge Equipment Recommendations:  (TBD)   Barriers to discharge: Decreased caregiver support and requires increased level of assistance    Assessment:     Aurora Go is a 83 y.o. female admitted with a medical diagnosis of TIA (transient ischemic attack).  She presents with the following impairments/functional limitations: weakness, impaired endurance, impaired self care skills, impaired functional mobility, gait instability, impaired balance Co-eval performed by PT/OT for efficacy of outcomes and safety; pt will benefit from cont PT services to maximize functional independence; recommendations TBD pending progress; during today's session, progress limited by c/o lightheadedness while seated and standing EOB; BPs taken and no evidence of orthostatic HOTN; pt reported feeling "like I almost passed out"; nurse notified; will progress as able. .    Rehab Prognosis: Good; patient would benefit from acute skilled PT services to address these deficits and reach maximum level of function.    Recent Surgery: * No surgery found *      Plan:     During this hospitalization, patient to be seen 3 x/week to address the identified rehab impairments via gait training, therapeutic activities, therapeutic exercises, neuromuscular re-education and progress toward the following goals:    Plan of Care Expires:  01/02/25    Subjective     Chief Complaint: lightheadedness  Patient/Family Comments/goals: return to PLOF; reports that she has a cyst in her R hip and behind her L knee that bothers her at times  Pain/Comfort:  Pain Rating 1: 0/10    Patients cultural, spiritual, Evangelical conflicts given the current situation: no    Living Environment:  Pt lives alone in [facility] with ramp entrance, [facility] with TTB  Prior to admission, patients level of function was Bettina with SPC " "PRN(at night when going to bathroom) wears slip on shoes at home.  Equipment used at home: walker, rolling, bedside commode, wheelchair, cane, straight, bath bench. Upon discharge, patient will have assistance from daughters who live nearby.    Objective:     Communicated with nurse prior to session.  Patient found HOB elevated with bed alarm, telemetry  upon PT entry to room.    General Precautions: Standard, fall  Orthopedic Precautions:N/A   Braces: N/A  Respiratory Status: Room air    Exams:  Cognitive Exam:  Patient is oriented to Person, Place, Time, Situation, and follows one and two step commands  Fine Motor Coordination:    -       Intact  Rapid alternating ankle DF/PF  Gross Motor Coordination:  reduced speed overall  Postural Exam:  Patient presented with the following abnormalities:    -       Rounded shoulders  -       Forward head  Sensation:    -       Intact  RLE ROM: WFL  RLE Strength: WFL except for hip fl/abd weakness~3- to 3  LLE ROM: WFL  LLE Strength: WFL except for hip fl/abd weaknss~3- to 3  Visual tracking without nystagmus, none elicited with head turns    Functional Mobility:  Bed Mobility:     Scooting: stand by assistance  Supine to Sit: stand by assistance  Sit to Supine: contact guard assistance  Transfers:     Sit to Stand:  contact guard assistance with rolling walker  Gait: Patient able to take ~6-8 small steps to HOB with CG/James and use of RW  Balance: sit ~fair, stand~fair, amb~fair to fair-    AM-PAC 6 CLICK MOBILITY  Total Score:18     Treatment & Education:  -pt performed activities as described above  -slow movement during transitions  -c/o light headedness upon assuming upright position  -BPs do not demonstrate orthostatic HOTN  -slow movement during transitions, increased time  -pt reports feeling "like I almost passed out" right before returning to supine  Supine: 131/76,   EOB: 132/78,   Stand: 143/72,     Patient left HOB elevated with all lines intact, " call button in reach, bed alarm on, and nurse notified.    GOALS:   Multidisciplinary Problems       Physical Therapy Goals          Problem: Physical Therapy    Goal Priority Disciplines Outcome Interventions   Physical Therapy Goal     PT, PT/OT Progressing    Description: Goals to be met by: 2024     Patient will increase functional independence with mobility by performin. Supine to sit with Modified [place]  2. Sit to supine with Modified [place]  3. Rolling with Modified [place].  4. Sit to stand transfer with Modified [place] with or without Single-point Cane  5. Bed to chair transfer with Modified [place] with or without Single-point Cane   6. Gait  x 80 feet with Modified [place] with or without Single-point Cane .                          History:     Past Medical History:   Diagnosis Date    Diabetes mellitus     Dyslipidemia     HTN (hypertension)     hypothyroidism     Morbid obesity     Stenosis        Past Surgical History:   Procedure Laterality Date    CARDIAC VALVE SURGERY  2012    HYSTERECTOMY  1986    TONSILLECTOMY, ADENOIDECTOMY  194       Time Tracking:     PT Received On: 24  PT Start Time: 1000     PT Stop Time: 1031  PT Total Time (min): 31 min     Billable Minutes: Evaluation 8 and Therapeutic Activity 23      2024

## 2024-12-02 NOTE — PROVIDER TRANSFER
Outside Transfer Acceptance Note / [facility]    Referring facility: [facility]   Referring provider: AKILA LOPEZ  Accepting facility: [facility]  Accepting provider: CLEMENTINE LINDQUIST IJEOMA N.  [facility]  Reason for transfer: Higher Level of Care   Transfer diagnosis: Stroke  Transfer specialty requested: Neurology  Transfer specialty notified: Yes  Transfer level: NUMBER 1-5: 2  Bed type requested: medtele  Isolation status: No active isolations   Admission class or status: IP- Inpatient    Narrative     82 yo History of HTN and DM, possible slight stroke 5 years ago. Woke up with dizziness, imbalance, ataxia, and legs felt heavy. Has associated nauseated and vomiting. No facial droop, vision appears to be normal, labs and urine looks good. CT of head negative. Portable chest xx-ray negative. Full ASA given. Dr. Joseph, neurology, recommended to go to [facility] for neurology consult. VS @13:45 115/50, HR 95, 98% RA, Resp 17-22, wt. 103.9 kg, 98.0 Oral     Objective     VS @13:45 115/50, HR 95, 98% RA, Resp 17-22, wt. 103.9 kg  (98.0 Oral @ 11:37 AM)   COVD negative    Labs 12/01/2024    HGb 13.5, Hct 40.6, MCV 90.9, MCHC 33.2, plt 237, neut 54.5%, Na 139, K 4.4, Cl 102, CO2 25, Glc 128, BUN 24, SCr 1.1, Ca 10, tot prot 7, alb 3.7, alk phos 77, ALT 11, AST 22, t bili 0.6, flu A/B, trop <0.1, UA negative.    Airway:   room air    Allergies: Review of patient's allergies indicates:  No Known Allergies   NPO: No    Anticoagulation:   Anticoagulants       None             Instructions      [facility]  Admit to Hospital Medicine  Upon patient arrival to floor, please contact Hospital Medicine on call.

## 2024-12-02 NOTE — PLAN OF CARE
Problem: Stroke, Ischemic (Includes Transient Ischemic Attack)  Goal: Optimal Coping  Outcome: Progressing  Goal: Effective Bowel Elimination  Outcome: Progressing

## 2024-12-02 NOTE — ASSESSMENT & PLAN NOTE
Body mass index is 46.35 kg/m². Morbid obesity complicates all aspects of disease management from diagnostic modalities to treatment. Weight loss encouraged and health benefits explained to patient.

## 2024-12-02 NOTE — ASSESSMENT & PLAN NOTE
Antithrombotics for secondary stroke prevention: Antiplatelets: Aspirin: 81 mg daily    Statins for secondary stroke prevention and hyperlipidemia, if present:   Statins: Atorvastatin- 40 mg daily    Aggressive risk factor modification: HTN, Obesity     Rehab efforts: The patient has been evaluated by a stroke team provider and the therapy needs have been fully considered based off the presenting complaints and exam findings. The following therapy evaluations are needed: PT evaluate and treat, OT evaluate and treat    Diagnostics ordered/pending: MRI head without contrast to assess brain parenchyma    VTE prophylaxis: Enoxaparin 40 mg SQ every 24 hours    BP parameters: TIA: SBP <220 until imaging confirmation of no infarct

## 2024-12-02 NOTE — H&P
[facility]  History & Physical    Patient Name: Aurora Go  MRN: [id number]  Patient Class: IP- Inpatient  Admission Date: 12/1/2024  Attending Physician: Marianne Mirza MD   Primary Care Provider: Clay Gamez MD         Patient information was obtained from patient and ER records.     Subjective:     Principal Problem:TIA (transient ischemic attack)    Chief Complaint:   Chief Complaint   Patient presents with    Dizziness        HPI: Aurora Go is a 83 yr old with a PMH of hypertension, diabetes who presents with dizziness, ataxia and associated nausea and vomiting at outside facility.    Patient was states earlier in the day, she awoke with dizziness, ataxia, legs feeling heavy associated nausea and vomiting.  She denied any facial droop, sensory changes.  She went to the ED for further evaluation.    Chest x-ray without signs of consolidation. CT of the head was negative at that time.    Patient was given aspirin.  Neurology was consulted with recommendations to transferred to [facility] for neurological consultation.    Upon arrival to [facility], triage vitals were fairly unremarkable.  Review of systems significant for dizziness.  Physical examination    Patient was admitted for possible TIA versus stroke.    Past Medical History:   Diagnosis Date    Diabetes mellitus     Dyslipidemia     HTN (hypertension)     hypothyroidism     Morbid obesity     Stenosis        Past Surgical History:   Procedure Laterality Date    CARDIAC VALVE SURGERY  2/19/2012    HYSTERECTOMY  1986    TONSILLECTOMY, ADENOIDECTOMY  1946       Review of patient's allergies indicates:  No Known Allergies    No current facility-administered medications on file prior to encounter.     Current Outpatient Medications on File Prior to Encounter   Medication Sig    aspirin (ECOTRIN) 81 MG EC tablet Take 1 tablet by mouth Daily.    atorvastatin (LIPITOR) 40 MG tablet Take 1 tablet (40 mg total) by  mouth once daily.    glyBURIDE (DIABETA) 1.25 MG Tab Take 1.25 mg by mouth once daily.    hydroCHLOROthiazide (HYDRODIURIL) 25 MG tablet Take 1 tablet by mouth once daily.    levothyroxine (SYNTHROID) 150 MCG tablet Take 150 mcg by mouth once daily.    ramipril (ALTACE) 5 MG capsule Take 5 mg by mouth once daily.    VITAMIN B-6 50 MG tablet Take 50 mg by mouth once daily.     Family History       Problem Relation (Age of Onset)    Breast cancer Maternal Aunt    Heart failure Mother    Hypertension Father          Tobacco Use    Smoking status: Never    Smokeless tobacco: Never   Substance and Sexual Activity    Alcohol use: No    Drug use: Not on file    Sexual activity: Not on file     Review of Systems   Constitutional:  Positive for fatigue.   HENT:  Negative for mouth sores and nosebleeds.    Eyes:  Negative for pain and redness.   Respiratory:  Negative for cough and shortness of breath.    Cardiovascular:  Negative for chest pain and leg swelling.   Gastrointestinal:  Negative for blood in stool and constipation.   Endocrine: Negative for polydipsia and polyphagia.   Genitourinary:  Negative for enuresis and flank pain.   Musculoskeletal:  Negative for gait problem and joint swelling.   Skin:  Negative for pallor and rash.   Neurological:  Positive for dizziness, weakness and light-headedness.   Psychiatric/Behavioral:  Negative for dysphoric mood.      Objective:     Vital Signs (Most Recent):  Temp: 98.2 °F (36.8 °C) (12/02/24 0354)  Pulse: 102 (12/02/24 0715)  Resp: 20 (12/02/24 0354)  BP: 127/67 (12/02/24 0354)  SpO2: 95 % (12/02/24 0715) Vital Signs (24h Range):  Temp:  [98.1 °F (36.7 °C)-98.2 °F (36.8 °C)] 98.2 °F (36.8 °C)  Pulse:  [] 102  Resp:  [18-20] 20  SpO2:  [94 %-99 %] 95 %  BP: (122-141)/(63-88) 127/67     Weight: 104.1 kg (229 lb 8 oz)  Body mass index is 46.35 kg/m².     Physical Exam  Vitals reviewed.   Constitutional:       General: She is not in acute distress.     Appearance: She is  not toxic-appearing.   HENT:      Right Ear: There is no impacted cerumen.      Left Ear: There is no impacted cerumen.      Nose: No congestion or rhinorrhea.      Mouth/Throat:      Pharynx: No oropharyngeal exudate or posterior oropharyngeal erythema.   Eyes:      General:         Right eye: No discharge.         Left eye: No discharge.   Cardiovascular:      Rate and Rhythm: Normal rate.      Heart sounds: No murmur heard.     No gallop.   Pulmonary:      Breath sounds: No wheezing or rales.   Abdominal:      General: There is no distension.      Tenderness: There is no abdominal tenderness.   Musculoskeletal:         General: No swelling or deformity.      Cervical back: No rigidity.   Lymphadenopathy:      Cervical: No cervical adenopathy.   Skin:     Coloration: Skin is not jaundiced.      Findings: No bruising.   Neurological:      General: No focal deficit present.      Cranial Nerves: No cranial nerve deficit.      Motor: No weakness.      Gait: Gait abnormal.   Psychiatric:         Mood and Affect: Mood normal.                Significant Labs: All pertinent labs within the past 24 hours have been reviewed.  BMP:   Recent Labs   Lab 12/02/24 0213         K 4.0      CO2 22*   BUN 21   CREATININE 1.0   CALCIUM 9.2   MG 2.0     CBC:   Recent Labs   Lab 12/02/24 0213   WBC 8.57   HGB 12.8   HCT 39.3        CMP:   Recent Labs   Lab 12/01/24 2004 12/02/24 0213   NA  --  140   K  --  4.0   CL  --  107   CO2  --  22*   GLU  --  101   BUN  --  21   CREATININE 1.1 1.0   CALCIUM  --  9.2   PROT  --  6.7   ALBUMIN  --  3.1*   BILITOT  --  0.6   ALKPHOS  --  81   AST  --  16   ALT  --  8*   ANIONGAP  --  11       Significant Imaging: I have reviewed all pertinent imaging results/findings within the past 24 hours.  I have reviewed and interpreted all pertinent imaging results/findings within the past 24 hours.  Assessment/Plan:     * TIA (transient ischemic attack)    Antithrombotics for  secondary stroke prevention: Antiplatelets: Aspirin: 81 mg daily    Statins for secondary stroke prevention and hyperlipidemia, if present:   Statins: Atorvastatin- 40 mg daily    Aggressive risk factor modification: HTN, Obesity     Rehab efforts: The patient has been evaluated by a stroke team provider and the therapy needs have been fully considered based off the presenting complaints and exam findings. The following therapy evaluations are needed: PT evaluate and treat, OT evaluate and treat    Diagnostics ordered/pending: MRI head without contrast to assess brain parenchyma    VTE prophylaxis: Enoxaparin 40 mg SQ every 24 hours    BP parameters: TIA: SBP <220 until imaging confirmation of no infarct         Hypothyroid      Plan:  Continue with Synthroid 150 mcg      Class 3 severe obesity due to excess calories in adult  Body mass index is 46.35 kg/m². Morbid obesity complicates all aspects of disease management from diagnostic modalities to treatment. Weight loss encouraged and health benefits explained to patient.         Hypertension goal BP (blood pressure) < 130/80  Patients blood pressure range in the last 24 hours was: BP  Min: 122/71  Max: 141/78.The patient's inpatient anti-hypertensive regimen is listed below:  Current Antihypertensives  hydroCHLOROthiazide tablet 25 mg, Daily, Oral  lisinopriL tablet 5 mg, Daily, Oral    Plan  - BP is controlled, no changes needed to their regimen    Hyperlipidemia    Plan:  Continue with statin      VTE Risk Mitigation (From admission, onward)           Ordered     enoxaparin injection 40 mg  Daily         12/01/24 2020     IP VTE HIGH RISK PATIENT  Once         12/01/24 1819     Place sequential compression device  Until discontinued         12/01/24 1819                                    Alba Anduajr MD  Department of Hospital Medicine  [facility]

## 2024-12-02 NOTE — NURSING
Notified Jamie Mckinnon MD pt has arrive on unit from [facility]. Tele monitor placed on patient.

## 2024-12-02 NOTE — PLAN OF CARE
"Pt would benefit from cont OT services in order to maximize functional independence. Recommending TBD pending progress. Pt limited this date by c/o lightheadedness EOB & OOB. See full note for BP chart, no orthostatic hypotension noted. Pt reports feeling "like I almost passed out" on return to supine; nsg notified. Will progress as able.     Problem: Occupational Therapy  Goal: Occupational Therapy Goal  Description: Goals to be met by: 01/02/2024     Patient will increase functional independence with ADLs by performing:    UE Dressing with Modified [place].  Grooming while standing with Modified [place].  Toileting from toilet with Modified [place] for hygiene and clothing management.   Step transfer with Modified [place]  Toilet transfer to toilet with Modified [place].    Outcome: Progressing     "

## 2024-12-02 NOTE — PLAN OF CARE
Problem: Adult Inpatient Plan of Care  Goal: Plan of Care Review  Outcome: Progressing  Goal: Patient-Specific Goal (Individualized)  Outcome: Progressing  Goal: Absence of Hospital-Acquired Illness or Injury  Outcome: Progressing  Goal: Optimal Comfort and Wellbeing  Outcome: Progressing  Goal: Readiness for Transition of Care  Outcome: Progressing     Problem: Comorbidity Management  Goal: Blood Pressure in Desired Range  Outcome: Progressing     Problem: Diabetes Comorbidity  Goal: Blood Glucose Level Within Targeted Range  Outcome: Progressing     Problem: Stroke, Ischemic (Includes Transient Ischemic Attack)  Goal: Optimal Coping  Outcome: Progressing  Goal: Effective Bowel Elimination  Outcome: Progressing  Goal: Optimal Cerebral Tissue Perfusion  Outcome: Progressing  Goal: Optimal Cognitive Function  Outcome: Progressing  Goal: Improved Communication Skills  Outcome: Progressing  Goal: Optimal Functional Ability  Outcome: Progressing  Goal: Optimal Nutrition Intake  Outcome: Progressing  Goal: Effective Oxygenation and Ventilation  Outcome: Progressing  Goal: Improved Sensorimotor Function  Outcome: Progressing  Goal: Safe and Effective Swallow  Outcome: Progressing  Goal: Effective Urinary Elimination  Outcome: Progressing     Problem: Infection  Goal: Absence of Infection Signs and Symptoms  Outcome: Progressing     Problem: Bariatric Environmental Safety  Goal: Safety Maintained with Care  Outcome: Progressing     Problem: Wound  Goal: Optimal Coping  Outcome: Progressing  Goal: Optimal Functional Ability  Outcome: Progressing  Goal: Absence of Infection Signs and Symptoms  Outcome: Progressing  Goal: Improved Oral Intake  Outcome: Progressing  Goal: Optimal Pain Control and Function  Outcome: Progressing  Goal: Skin Health and Integrity  Outcome: Progressing  Goal: Optimal Wound Healing  Outcome: Progressing     Problem: Fall Injury Risk  Goal: Absence of Fall and Fall-Related Injury  Outcome:  Progressing

## 2024-12-02 NOTE — HPI
Aurora Go is a 83 yr old with a PMH of hypertension, diabetes who presents with dizziness, ataxia and associated nausea and vomiting at outside facility.    Patient was states earlier in the day, she awoke with dizziness, ataxia, legs feeling heavy associated nausea and vomiting.  She denied any facial droop, sensory changes.  She went to the ED for further evaluation.    Chest x-ray without signs of consolidation. CT of the head was negative at that time.    Patient was given aspirin.  Neurology was consulted with recommendations to transferred to [facility] for neurological consultation.    Upon arrival to [facility], triage vitals were fairly unremarkable.  Review of systems significant for dizziness.  Physical examination    Patient was admitted for possible TIA versus stroke.

## 2024-12-02 NOTE — PLAN OF CARE
1015  PT/OT at the bedside working with the pt when CM rounded. No family present. CM will return to complete the discharge planning assessment.     1115  Pt off the unit when CM returned. No family present.        12/02/24 1120   Rounds   Attendance Provider;Nurse    Discharge Plan A Home Health   Why the patient remains in the hospital Requires continued medical care       CM was informed by Dr Mirza that the pt is not medically stable to discharge & is scheduled to have an MRI and 2D echo done today.       [facility] - Telemetry  Initial Discharge Assessment       Primary Care Provider: Clay Gamez MD    Admission Diagnosis: Stroke [I63.9]    Admission Date: 12/1/2024  Expected Discharge Date: 12/3/2024    Consult: neuro & PT/OT/SLP    Payor: BLUE CROSS BLUE SHIELD / [facility] / Product Type: Commercial /     Extended Emergency Contact Information  Primary Emergency Contact: Ema Long  Mobile Phone: [phone]  Relation: Daughter  Secondary Emergency Contact: BalajileifChantal  Mobile Phone: [phone]  Relation: Daughter    Discharge Plan A: (P) Home Health  Discharge Plan B: (P) Skilled Nursing Facility      [id number] - [place], [place] - [address]  [address]  [place] [address]  Phone: [phone] Fax: [phone]      Initial Assessment (most recent)       Adult Discharge Assessment - 12/02/24 1310          Discharge Assessment    Assessment Type Discharge Planning Assessment (P)      Confirmed/corrected address, phone number and insurance Yes (P)      Confirmed Demographics Correct on Facesheet (P)      Source of Information patient;family (P)    sonNader ([phone])    Communicated LEDY with patient/caregiver Date not available/Unable to determine (P)      People in Home alone (P)      Do you expect to return to your current living situation? Yes (P)      Do you have help at home or someone to help you manage your care at  home? Yes (P)      Prior to hospitilization cognitive status: Alert/Oriented (P)      Current cognitive status: Alert/Oriented (P)      Equipment Currently Used at Home cane, straight;shower chair;blood pressure machine;grab bar;glucometer (P)      Readmission within 30 days? No (P)      Patient currently being followed by outpatient case management? No (P)      Do you currently have service(s) that help you manage your care at home? No (P)      Do you take prescription medications? Yes (P)      Do you have prescription coverage? Yes (P)      Do you have any problems affording any of your prescribed medications? No (P)      Is the patient taking medications as prescribed? yes (P)      How do you get to doctors appointments? family or friend will provide (P)      Are you on dialysis? No (P)      Do you take coumadin? No (P)      Discharge Plan A Home Health (P)      Discharge Plan B Skilled Nursing Facility (P)      DME Needed Upon Discharge  other (see comments) (P)    tbd    Discharge Plan discussed with: Patient;Adult children (P)         Physical Activity    On average, how many days per week do you engage in moderate to strenuous exercise (like a brisk walk)? 5 days (P)      On average, how many minutes do you engage in exercise at this level? 20 min (P)         Financial Resource Strain    How hard is it for you to pay for the very basics like food, housing, medical care, and heating? Not hard at all (P)         Housing Stability    In the last 12 months, was there a time when you were not able to pay the mortgage or rent on time? No (P)      At any time in the past 12 months, were you homeless or living in a shelter (including now)? No (P)         Transportation Needs    Has the lack of transportation kept you from medical appointments, meetings, work or from getting things needed for daily living? No (P)         Food Insecurity    Within the past 12 months, you worried that your food would run out before you  got the money to buy more. Never true (P)      Within the past 12 months, the food you bought just didn't last and you didn't have money to get more. Never true (P)         Stress    Do you feel stress - tense, restless, nervous, or anxious, or unable to sleep at night because your mind is troubled all the time - these days? Not at all (P)         Social Isolation    How often do you feel lonely or isolated from those around you?  Never (P)         Alcohol Use    Q1: How often do you have a drink containing alcohol? Never (P)      Q2: How many drinks containing alcohol do you have on a typical day when you are drinking? Patient does not drink (P)      Q3: How often do you have six or more drinks on one occasion? Never (P)         UtilFirstRain    In the past 12 months has the electric, gas, oil, or water company threatened to shut off services in your home? No (P)         Health Literacy    How often do you need to have someone help you when you read instructions, pamphlets, or other written material from your doctor or pharmacy? Rarely (P)    pt wears glasses                  1310  Patient resting quietly in bed with her son, Nader Singh ([phone]), at the bedside when CM rounded. Patient was admitted with TIA & is being followed by neuro and PT/OT/SLP.    Patient lives alone, has equipment to assist with ADLs, & denied the need for assistance with transportation at time of discharge.     Pt in agreement to receive HH services but does not want to be admitted to a SNF. Pt did not have a HH preference.     CM updated patient's whiteboard with CM name & contact information.     1440  Hospital follow up appointment scheduled for the pt with Dr Clay Gamez (PCP) on 12/17/2024 at 1030. Information added to the pt's discharge paperwork.       Will continue to follow.

## 2024-12-02 NOTE — PLAN OF CARE
Problem: Stroke, Ischemic (Includes Transient Ischemic Attack)  Goal: Optimal Coping  Outcome: Progressing  Goal: Effective Bowel Elimination  Outcome: Progressing  Goal: Optimal Cognitive Function  Outcome: Progressing  Goal: Improved Communication Skills  Outcome: Progressing  Goal: Optimal Nutrition Intake  Outcome: Progressing  Goal: Effective Oxygenation and Ventilation  Outcome: Progressing  Goal: Improved Sensorimotor Function  Outcome: Progressing  Goal: Safe and Effective Swallow  Outcome: Progressing  Goal: Effective Urinary Elimination  Outcome: Progressing     Problem: Stroke, Ischemic (Includes Transient Ischemic Attack)  Goal: Optimal Functional Ability  Outcome: Not Progressing

## 2024-12-02 NOTE — PLAN OF CARE
12/01/24 1853   Admission   Initial VN Admission Questions Complete   Communication Issues? None   Shift   Virtual Nurse - Rounding Complete   Pain Management Interventions care clustered;diversional activity provided;quiet environment facilitated;relaxation techniques promoted   Virtual Nurse - Patient Verbalized Approval Of Camera Use;VN Rounding   Type of Frequent Check   Type Patient Rounds;Telemetry Monitoring   Safety/Activity   Patient Rounds bed in low position;ID band on;placement of personal items at bedside;bed wheels locked;call light in patient/parent reach;clutter free environment maintained;visualized patient   Safety Promotion/Fall Prevention assistive device/personal item within reach;bed alarm set;diversional activities provided;Fall Risk reviewed with patient/family;Fall Risk signage in place;instructed to call staff for mobility;medications reviewed;patient expresses understanding of fall risk and prevention;side rails raised x 2   Positioning   Body Position position changed independently;supine   Head of Bed (HOB) Positioning HOB elevated   Pain/Comfort/Sleep   Preferred Pain Scale number (Numeric Rating Pain Scale)   Cardiac   Cardiac/Telemetry Monitor On Yes   ECG   Rhythm normal sinus rhythm  (1 degree AV block)     VN cued into patient's room for introduction with patient's permission.  VN role explained and informed patient that VN would be working with bedside nurse and rest of care team.  Plan of care reviewed, pt encouraged to refer to whiteboard to determine staff for the day and pt/family educated on VTE,  fall risk and advised to call for assistance at all times to ensure pt's safety. Pt is without any signs of pain or neurological complaints. Patient's chart, labs and vital signs reviewed.  Allowed time for questions.  Will continue to be available as needed.

## 2024-12-02 NOTE — PROGRESS NOTES
Skin assessment performed with nurse noting red rash to skin folds to breast, abdomen, groin.     R abdomen        R breast        Bilateral buttocks- moist with darkened discoloration        Chest- redness from removal of electrodes       Recommendations discussed with pt, nurse and Dr. Mirza:  - Pressure injury prevention interventions   - Miconazole powder to breast, abdomen and groin BID  - Moisture barrier to buttocks BID  - Moisturizer to chest reddened areas

## 2024-12-02 NOTE — PLAN OF CARE
"  Problem: Physical Therapy  Goal: Physical Therapy Goal  Description: Goals to be met by: 2024     Patient will increase functional independence with mobility by performin. Supine to sit with Modified [place]  2. Sit to supine with Modified [place]  3. Rolling with Modified [place].  4. Sit to stand transfer with Modified [place] with or without Single-point Cane  5. Bed to chair transfer with Modified [place] with or without Single-point Cane   6. Gait  x 80 feet with Modified [place] with or without Single-point Cane .     Outcome: Progressing   Co-eval performed by PT/OT for efficacy of outcomes and safety; pt will benefit from cont PT services to maximize functional independence; recommendations TBD pending progress; during today's session, progress limited by c/o lightheadedness while seated and standing EOB; BPs taken and no evidence of orthostatic HOTN; pt reported feeling "like I almost passed out"; nurse notified; will progress as able.  "

## 2024-12-02 NOTE — SUBJECTIVE & OBJECTIVE
Past Medical History:   Diagnosis Date    Diabetes mellitus     Dyslipidemia     HTN (hypertension)     hypothyroidism     Morbid obesity     Stenosis        Past Surgical History:   Procedure Laterality Date    CARDIAC VALVE SURGERY  2/19/2012    HYSTERECTOMY  1986    TONSILLECTOMY, ADENOIDECTOMY  1946       Review of patient's allergies indicates:  No Known Allergies    No current facility-administered medications on file prior to encounter.     Current Outpatient Medications on File Prior to Encounter   Medication Sig    aspirin (ECOTRIN) 81 MG EC tablet Take 1 tablet by mouth Daily.    atorvastatin (LIPITOR) 40 MG tablet Take 1 tablet (40 mg total) by mouth once daily.    glyBURIDE (DIABETA) 1.25 MG Tab Take 1.25 mg by mouth once daily.    hydroCHLOROthiazide (HYDRODIURIL) 25 MG tablet Take 1 tablet by mouth once daily.    levothyroxine (SYNTHROID) 150 MCG tablet Take 150 mcg by mouth once daily.    ramipril (ALTACE) 5 MG capsule Take 5 mg by mouth once daily.    VITAMIN B-6 50 MG tablet Take 50 mg by mouth once daily.     Family History       Problem Relation (Age of Onset)    Breast cancer Maternal Aunt    Heart failure Mother    Hypertension Father          Tobacco Use    Smoking status: Never    Smokeless tobacco: Never   Substance and Sexual Activity    Alcohol use: No    Drug use: Not on file    Sexual activity: Not on file     Review of Systems   Constitutional:  Positive for fatigue.   HENT:  Negative for mouth sores and nosebleeds.    Eyes:  Negative for pain and redness.   Respiratory:  Negative for cough and shortness of breath.    Cardiovascular:  Negative for chest pain and leg swelling.   Gastrointestinal:  Negative for blood in stool and constipation.   Endocrine: Negative for polydipsia and polyphagia.   Genitourinary:  Negative for enuresis and flank pain.   Musculoskeletal:  Negative for gait problem and joint swelling.   Skin:  Negative for pallor and rash.   Neurological:  Positive for  dizziness, weakness and light-headedness.   Psychiatric/Behavioral:  Negative for dysphoric mood.      Objective:     Vital Signs (Most Recent):  Temp: 98.2 °F (36.8 °C) (12/02/24 0354)  Pulse: 102 (12/02/24 0715)  Resp: 20 (12/02/24 0354)  BP: 127/67 (12/02/24 0354)  SpO2: 95 % (12/02/24 0715) Vital Signs (24h Range):  Temp:  [98.1 °F (36.7 °C)-98.2 °F (36.8 °C)] 98.2 °F (36.8 °C)  Pulse:  [] 102  Resp:  [18-20] 20  SpO2:  [94 %-99 %] 95 %  BP: (122-141)/(63-88) 127/67     Weight: 104.1 kg (229 lb 8 oz)  Body mass index is 46.35 kg/m².     Physical Exam  Vitals reviewed.   Constitutional:       General: She is not in acute distress.     Appearance: She is not toxic-appearing.   HENT:      Right Ear: There is no impacted cerumen.      Left Ear: There is no impacted cerumen.      Nose: No congestion or rhinorrhea.      Mouth/Throat:      Pharynx: No oropharyngeal exudate or posterior oropharyngeal erythema.   Eyes:      General:         Right eye: No discharge.         Left eye: No discharge.   Cardiovascular:      Rate and Rhythm: Normal rate.      Heart sounds: No murmur heard.     No gallop.   Pulmonary:      Breath sounds: No wheezing or rales.   Abdominal:      General: There is no distension.      Tenderness: There is no abdominal tenderness.   Musculoskeletal:         General: No swelling or deformity.      Cervical back: No rigidity.   Lymphadenopathy:      Cervical: No cervical adenopathy.   Skin:     Coloration: Skin is not jaundiced.      Findings: No bruising.   Neurological:      General: No focal deficit present.      Cranial Nerves: No cranial nerve deficit.      Motor: No weakness.      Gait: Gait abnormal.   Psychiatric:         Mood and Affect: Mood normal.                Significant Labs: All pertinent labs within the past 24 hours have been reviewed.  BMP:   Recent Labs   Lab 12/02/24  0213         K 4.0      CO2 22*   BUN 21   CREATININE 1.0   CALCIUM 9.2   MG 2.0      CBC:   Recent Labs   Lab 12/02/24 0213   WBC 8.57   HGB 12.8   HCT 39.3        CMP:   Recent Labs   Lab 12/01/24 2004 12/02/24 0213   NA  --  140   K  --  4.0   CL  --  107   CO2  --  22*   GLU  --  101   BUN  --  21   CREATININE 1.1 1.0   CALCIUM  --  9.2   PROT  --  6.7   ALBUMIN  --  3.1*   BILITOT  --  0.6   ALKPHOS  --  81   AST  --  16   ALT  --  8*   ANIONGAP  --  11       Significant Imaging: I have reviewed all pertinent imaging results/findings within the past 24 hours.  I have reviewed and interpreted all pertinent imaging results/findings within the past 24 hours.

## 2024-12-02 NOTE — ASSESSMENT & PLAN NOTE
Patients blood pressure range in the last 24 hours was: BP  Min: 122/71  Max: 141/78.The patient's inpatient anti-hypertensive regimen is listed below:  Current Antihypertensives  hydroCHLOROthiazide tablet 25 mg, Daily, Oral  lisinopriL tablet 5 mg, Daily, Oral    Plan  - BP is controlled, no changes needed to their regimen

## 2024-12-02 NOTE — CONSULTS
NEUROLOGY CONSULT EVALUATION  Aurora Go  1940  [id number]    Reason for consult: concern for stroke     Informant: patient and chart       Other sources of information: chart and family    CC: dizziness    HPI:     Aurora Go is a 83 y.o. year old female who presented as a transfer to [place] for concerns of dizziness, ataxia, nausea, and vomiting.    Ont he day of admission patient states she woke up with dizziness, ataxia, leg heaviness, nausea, and vomiting.  She came to the ER for further assessment after the symptoms would not improve.    She was concern for stroke.  CT head was negative.  Patient transferred to [facility] for concern of stroke.  Upon arriving at [facility], the patient had a complaint of dizziness but otherwise unremarkable exam.    MRI brain was completed which was negative.      Patient has a past medical history of hypertension, hyperlipidaemia, coronary artery disease, diabetes, hypothyroidism, and transient ischaemic attack.      ROS: is as above    Histories:   Allergies:  Patient has no known allergies.    Current Medications:    Current Facility-Administered Medications   Medication Dose Route Frequency Provider Last Rate Last Admin    aspirin EC tablet 81 mg  81 mg Oral Daily Jamie Mckinnon MD   81 mg at 12/02/24 1002    atorvastatin tablet 40 mg  40 mg Oral Daily Jamie Mckinnon MD   40 mg at 12/02/24 1002    bisacodyL suppository 10 mg  10 mg Rectal Daily PRN Jamie Mckinnon MD        dextrose 10% bolus 125 mL 125 mL  12.5 g Intravenous PRN Marianne Mirza MD        dextrose 10% bolus 250 mL 250 mL  25 g Intravenous PRN Marianne Mirza MD        enoxaparin injection 40 mg  40 mg Subcutaneous Daily Liset Rivera MD        glucagon (human recombinant) injection 1 mg  1 mg Intramuscular PRN Marianne Mirza MD        glucose chewable tablet 16 g  16 g Oral PRN Marianne Mirza MD        glucose chewable tablet 24 g  24 g Oral  PRN Marianne Mirza MD        hydroCHLOROthiazide tablet 25 mg  25 mg Oral Daily Alba Andujar MD   25 mg at 12/02/24 1002    insulin aspart U-100 pen 0-5 Units  0-5 Units Subcutaneous QID (AC + HS) PRN Marianne Mirza MD        levothyroxine tablet 150 mcg  150 mcg Oral Before breakfast Alba Andujar MD   150 mcg at 12/02/24 0505    lisinopriL tablet 5 mg  5 mg Oral Daily Alba Andujar MD        miconazole NITRATE 2 % top powder   Topical (Top) BID Marianne Mirza MD   Given at 12/02/24 1051    sodium chloride 0.9% flush 10 mL  10 mL Intravenous PRN Jamie Mckinnon MD         Past Medical/Surgical/Family History:  PMHx:   Past Medical History:   Diagnosis Date    Diabetes mellitus     Dyslipidemia     HTN (hypertension)     hypothyroidism     Morbid obesity     Stenosis       Surgeries:   Past Surgical History:   Procedure Laterality Date    CARDIAC VALVE SURGERY  2/19/2012    HYSTERECTOMY  1986    TONSILLECTOMY, ADENOIDECTOMY  1946      Family  Hx:   Family History   Problem Relation Name Age of Onset    Heart failure Mother      Hypertension Father      Breast cancer Maternal Aunt        Social History:  Substance Abuse/Dependence Histor  Tobacco: denied  EtOH: none  Illicit drugs: none  Occupational/Employment History:  Occupation: retired    Current Evaluation:   Vital Signs:   Vitals:    12/02/24 1230   BP: 136/64   Pulse: 88   Resp: 15   Temp: 98.3 °F (36.8 °C)      Neurological Examination   Orientation  Alert, awake, oriented to self, place, time, and situation  Memory  Recent and remote memory intact  Language  No dysarthria, No aphasia  Cranial Nerves  PERRL, VF intact, EOMI, V1-V3 intact, symmetric facial expression, hearing grossly intact, SCM & TPZ 5/5, tongue midline, symmetric palate elevation  Motor  Normal Bulk  Normal Tone  5/5 strength in 4 extremities  Though limitations with the left knee due to cyst per patient  Sensory  Normal to light touch  throughout  Normal vibration and proprioception  Romberg deferred  DTR   +2 symmetric  Cerebellar/Gait  Normal finger to nose  Heel shin assessment impaired due to left knee and leg decreased range of motion  Gait deferred    Modified Barren Scale: 1    NIH Stroke Scale  1a. Level of consciousness 0=alert; keenly responsive   1b. LOC questions 0=Answers both tasks correctly   1c. LOC commands 0=Answers both tasks correctly   2. Best gaze 0=normal   3. Visual 0=No visual loss   4.  Facial palsy 0=Normal symmetric movement   5.  Motor left arm 0=No drift, limb holds 90 (or 45) degrees for full 10 seconds   5b. Motor right arm 0=No drift, limb holds 90 (or 45) degrees for full 10 seconds   6a. Motor left leg 0=No drift, limb holds 90 (or 45) degrees for full 10 seconds   6b. Motor right leg 0=No drift, limb holds 90 (or 45) degrees for full 10 seconds   7. Limb ataxia 0=Absent   8. Sensory 0=Normal; no sensory loss   9. Best language 0=No aphasia, normal   10. Dysarthria 0=Normal   11.  Extinction and inattention 0=No abnormality                                TOTAL: 0     LABORATORY STUDIES:  Reviewed lab studies in EPIC    A1c: 6.1  LDL: 153  TSH: 0.104, free T4 1.18    RADIOLOGY STUDIES:  MRI Brain Without Contrast Result Date: 12/2/2024  No significant change from prior specifically without evidence for acute infarction or significant new parenchymal signal abnormality Small scattered areas of encephalomalacia bilateral cerebellar hemispheres and left posterior parietal cortex suggestive for prior infarcts Few scattered patchy and confluent regions of T2 FLAIR signal abnormality supratentorial white matter while nonspecific similar to prior and suggestive for underlying chronic ischemic change     Assessment and Plan:   83 year old female with generalized symptoms and nausea where initial concern was for stroke.  Extensive work up thus far including MRI brain has been negative for acute stroke.  Patient given  fluids and indicates that her dizziness has mildly improved.  At this time no further work up and or management from neurology perspective.      Treatment Plan:  - MRI brain without contrast, negative for any acute stroke  - CT angiogram head and neck with contrast, pending  - TTE with bubble study, pending  - STOP aspirin 81mg  - start plavix 75mg once daily  - lipitor 80mg once daily  - PT/OT/speech  - no need for outpatient follow up with vascular neurology    Differential diagnosis was explained to the patient. All questions were answered. Patient understood and agreed to adhere to plan.     Emanuel Raya MD  [employer] [facility] PGY-IV  [facility] Consult Service

## 2024-12-02 NOTE — PT/OT/SLP EVAL
Speech Language Pathology Evaluation  Bedside Swallow    Patient Name:  Aurora Go   MRN:  [id number]  Admitting Diagnosis: TIA (transient ischemic attack)    Recommendations:                 General Recommendations:  Follow-up not indicated  Diet recommendations:  Regular Diet - IDDSI Level 7, Thin liquids - IDDSI Level 0   Aspiration Precautions: standard precautions    General Precautions: Standard, fall  Communication strategies:  none    Assessment:     Aurora Go is a 83 y.o. female admitted for TIA vs CVA who presents with timely swallow and communication is at baseline.     History per MD    HPI: Aurora Go is a 83 yr old with a PMH of hypertension, diabetes who presents with dizziness, ataxia and associated nausea and vomiting at outside facility.     Patient was states earlier in the day, she awoke with dizziness, ataxia, legs feeling heavy associated nausea and vomiting.  She denied any facial droop, sensory changes.  She went to the ED for further evaluation.     Chest x-ray without signs of consolidation. CT of the head was negative at that time.     Patient was given aspirin.  Neurology was consulted with recommendations to transferred to [facility] for neurological consultation.     Upon arrival to [facility], triage vitals were fairly unremarkable.  Review of systems significant for dizziness.  Physical examination     Patient was admitted for possible TIA versus stroke.      Past Medical History:   Diagnosis Date    Diabetes mellitus     Dyslipidemia     HTN (hypertension)     hypothyroidism     Morbid obesity     Stenosis        Past Surgical History:   Procedure Laterality Date    CARDIAC VALVE SURGERY  2/19/2012    HYSTERECTOMY  1986    TONSILLECTOMY, ADENOIDECTOMY  1946       Social History: Patient lives at home alone, indep with ADLs  Imaging results pending     Prior diet: reg/thin.    Subjective     Consult received for clinical swallow eval/speech/lang eval this date, SLP  "did communicate with RN prior to eval/treat.    Patient goals: "I hope they find out what caused all this."     Pain/Comfort:  Pain Rating 1: 0/10    Respiratory Status: room air     Objective:   Pt found in room, she is awake and alert.   Pt had eaten some of breakfast but is awaiting to go for testing.   Pt cooperative, ox4 and follows commands.     Oral Musculature Evaluation  Oral Musculature: WFL  Dentition: present and adequate  Secretion Management: adequate  Mucosal Quality: good, adequate  Mandibular Strength and Mobility: WFL  Oral Labial Strength and Mobility: WFL  Lingual Strength and Mobility: WFL  Buccal Strength and Mobility: WFL  Volitional Cough: elicited  Volitional Swallow: timely  Voice Prior to PO Intake: clear voice    Bedside Swallow Eval:   Consistencies Assessed:  Thin liquids water by straw   Puree pudding   Solids shira cracker       Oral Phase:   WFL    Pharyngeal Phase:   WFL  Timely swallow   No coughing or choking  Timely laryngeal lift palpated     Compensatory Strategies  None    Treatment: SLP provided patient education on SLP recommendations, SLP role, s/s and risks of aspiration, safe swallow precautions, and POC. The patient v/u of all discussed and is in agreement with POC.       Goals:   Multidisciplinary Problems       SLP Goals       Not on file                    Plan:     Patient to be seen:      Plan of Care expires:     Plan of Care reviewed with:  patient   SLP Follow-Up:  No       Discharge recommendations:   (pending PT and OT recs)   Barriers to Discharge:  None    Time Tracking:     SLP Treatment Date:   12/02/24  Speech Start Time:  0924  Speech Stop Time:  0944     Speech Total Time (min):  20 min    Billable Minutes: Eval Swallow and Oral Function 12 and Self Care/Home Management Training 8    12/02/2024         "

## 2024-12-03 PROBLEM — I65.22 MORE THAN 50 PERCENT STENOSIS OF LEFT INTERNAL CAROTID ARTERY: Status: ACTIVE | Noted: 2024-12-03

## 2024-12-03 PROBLEM — I67.1 INTERNAL CAROTID ANEURYSM: Status: ACTIVE | Noted: 2024-12-03

## 2024-12-03 LAB
ALBUMIN SERPL BCP-MCNC: 3.3 G/DL (ref 3.5–5.2)
ALP SERPL-CCNC: 82 U/L (ref 40–150)
ALT SERPL W/O P-5'-P-CCNC: 11 U/L (ref 10–44)
ANION GAP SERPL CALC-SCNC: 12 MMOL/L (ref 8–16)
AST SERPL-CCNC: 16 U/L (ref 10–40)
BASOPHILS # BLD AUTO: 0.05 K/UL (ref 0–0.2)
BASOPHILS NFR BLD: 0.6 % (ref 0–1.9)
BILIRUB SERPL-MCNC: 0.5 MG/DL (ref 0.1–1)
BUN SERPL-MCNC: 24 MG/DL (ref 8–23)
CALCIUM SERPL-MCNC: 9 MG/DL (ref 8.7–10.5)
CHLORIDE SERPL-SCNC: 104 MMOL/L (ref 95–110)
CO2 SERPL-SCNC: 21 MMOL/L (ref 23–29)
CREAT SERPL-MCNC: 1.2 MG/DL (ref 0.5–1.4)
DIFFERENTIAL METHOD BLD: NORMAL
EOSINOPHIL # BLD AUTO: 0.2 K/UL (ref 0–0.5)
EOSINOPHIL NFR BLD: 2.7 % (ref 0–8)
ERYTHROCYTE [DISTWIDTH] IN BLOOD BY AUTOMATED COUNT: 13.2 % (ref 11.5–14.5)
EST. GFR  (NO RACE VARIABLE): 45 ML/MIN/1.73 M^2
GLUCOSE SERPL-MCNC: 126 MG/DL (ref 70–110)
HCT VFR BLD AUTO: 41.9 % (ref 37–48.5)
HGB BLD-MCNC: 13.7 G/DL (ref 12–16)
IMM GRANULOCYTES # BLD AUTO: 0.01 K/UL (ref 0–0.04)
IMM GRANULOCYTES NFR BLD AUTO: 0.1 % (ref 0–0.5)
LYMPHOCYTES # BLD AUTO: 1.8 K/UL (ref 1–4.8)
LYMPHOCYTES NFR BLD: 23.3 % (ref 18–48)
MCH RBC QN AUTO: 30 PG (ref 27–31)
MCHC RBC AUTO-ENTMCNC: 32.7 G/DL (ref 32–36)
MCV RBC AUTO: 92 FL (ref 82–98)
MONOCYTES # BLD AUTO: 0.6 K/UL (ref 0.3–1)
MONOCYTES NFR BLD: 7.2 % (ref 4–15)
NEUTROPHILS # BLD AUTO: 5.1 K/UL (ref 1.8–7.7)
NEUTROPHILS NFR BLD: 66.1 % (ref 38–73)
NRBC BLD-RTO: 0 /100 WBC
PLATELET # BLD AUTO: 230 K/UL (ref 150–450)
PMV BLD AUTO: 10.8 FL (ref 9.2–12.9)
POCT GLUCOSE: 126 MG/DL (ref 70–110)
POCT GLUCOSE: 126 MG/DL (ref 70–110)
POCT GLUCOSE: 141 MG/DL (ref 70–110)
POCT GLUCOSE: 163 MG/DL (ref 70–110)
POTASSIUM SERPL-SCNC: 3.8 MMOL/L (ref 3.5–5.1)
PROT SERPL-MCNC: 7 G/DL (ref 6–8.4)
RBC # BLD AUTO: 4.56 M/UL (ref 4–5.4)
SODIUM SERPL-SCNC: 137 MMOL/L (ref 136–145)
WBC # BLD AUTO: 7.74 K/UL (ref 3.9–12.7)

## 2024-12-03 PROCEDURE — 97530 THERAPEUTIC ACTIVITIES: CPT

## 2024-12-03 PROCEDURE — 11000001 HC ACUTE MED/SURG PRIVATE ROOM

## 2024-12-03 PROCEDURE — 97535 SELF CARE MNGMENT TRAINING: CPT

## 2024-12-03 PROCEDURE — 25000003 PHARM REV CODE 250: Performed by: STUDENT IN AN ORGANIZED HEALTH CARE EDUCATION/TRAINING PROGRAM

## 2024-12-03 PROCEDURE — 97116 GAIT TRAINING THERAPY: CPT

## 2024-12-03 PROCEDURE — 36415 COLL VENOUS BLD VENIPUNCTURE: CPT | Performed by: INTERNAL MEDICINE

## 2024-12-03 PROCEDURE — 94761 N-INVAS EAR/PLS OXIMETRY MLT: CPT

## 2024-12-03 PROCEDURE — 99900035 HC TECH TIME PER 15 MIN (STAT)

## 2024-12-03 PROCEDURE — 85025 COMPLETE CBC W/AUTO DIFF WBC: CPT | Performed by: INTERNAL MEDICINE

## 2024-12-03 PROCEDURE — 25000003 PHARM REV CODE 250: Performed by: INTERNAL MEDICINE

## 2024-12-03 PROCEDURE — 63600175 PHARM REV CODE 636 W HCPCS: Performed by: FAMILY MEDICINE

## 2024-12-03 PROCEDURE — 80053 COMPREHEN METABOLIC PANEL: CPT | Performed by: INTERNAL MEDICINE

## 2024-12-03 RX ADMIN — CLOPIDOGREL BISULFATE 75 MG: 75 TABLET ORAL at 08:12

## 2024-12-03 RX ADMIN — MICONAZOLE NITRATE: 20 POWDER TOPICAL at 08:12

## 2024-12-03 RX ADMIN — ENOXAPARIN SODIUM 40 MG: 40 INJECTION SUBCUTANEOUS at 04:12

## 2024-12-03 RX ADMIN — ATORVASTATIN CALCIUM 40 MG: 40 TABLET, FILM COATED ORAL at 08:12

## 2024-12-03 RX ADMIN — LEVOTHYROXINE SODIUM 150 MCG: 75 TABLET ORAL at 05:12

## 2024-12-03 RX ADMIN — HYDROCHLOROTHIAZIDE 25 MG: 25 TABLET ORAL at 08:12

## 2024-12-03 RX ADMIN — LISINOPRIL 5 MG: 5 TABLET ORAL at 08:12

## 2024-12-03 NOTE — ASSESSMENT & PLAN NOTE
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

## 2024-12-03 NOTE — PLAN OF CARE
Problem: Physical Therapy  Goal: Physical Therapy Goal  Description: Goals to be met by: 2024     Patient will increase functional independence with mobility by performin. Supine to sit with Modified [place]  2. Sit to supine with Modified [place]  3. Rolling with Modified [place].  4. Sit to stand transfer with Modified [place] with or without Single-point Cane  5. Bed to chair transfer with Modified [place] with or without Single-point Cane   6. Gait  x 80 feet with Modified [place] with or without Single-point Cane .     Outcome: Progressing   Patient reports no lightheadedness or dizziness throughout session; pt reports LEs feeling weak as if to give way, and at times has painful knees; pt performed sup to sit with SBA; sit to stand with SBA/CGA using RW- pt amb ~20ft using RW with SBA/CGA; without RW, pt is unsafe with decreased fluidity of movement and reaching out for furniture for support; will recommend low intensity therapy (home health PT/OT, BSC for use at night at bedside, and RW for safety with maneuvering in home); will cont with POC.

## 2024-12-03 NOTE — ASSESSMENT & PLAN NOTE
Patients blood pressure range in the last 24 hours was: BP  Min: 95/61  Max: 135/74.The patient's inpatient anti-hypertensive regimen is listed below:  Current Antihypertensives  hydroCHLOROthiazide tablet 25 mg, Daily, Oral  lisinopriL tablet 5 mg, Daily, Oral    Plan  - BP is controlled, no changes needed to their regimen   No

## 2024-12-03 NOTE — PT/OT/SLP PROGRESS
Occupational Therapy      Patient Name:  Aurora Go   MRN:  [id number]    Patient not seen today secondary to Testing/imaging (xray/CT/MRI) (ultrasound). Will follow-up as able.    12/3/2024

## 2024-12-03 NOTE — PLAN OF CARE
0755  [facility]. Awaiting response.        12/03/24 1040   Rounds   Attendance Provider;Nurse ;Nurse   Discharge Plan A Home Health   Why the patient remains in the hospital Requires continued medical care     1040  Patient resting quietly in bed with granddaughter, Carlee Singh ([phone]), at the beside when CHYNA participated in SIBR with Dr Maurer & nurse Paula. Pt was admitted with a TIA & continues to be followed by neuro, wound care, & PT/PT. MD informed the pt that she is not medically stable to dc today.     CM informed the pt & granddaughter of PT/OT recs for HH services, RW, & BSC following discharge and of HH referrals sent. Pt stated that she has a RW and BSC at home & that her daughter, Ema Long ([phone]), will know of a HH preference.     1341  CM was informed by Ema that the pt does not have a RW or BSC & requested that the pt receive HH services from [facility], & that all hospfu appts be scheduled at [facility]. Referral manually faxed to [place] (f [phone]) w/[facility]. Awaiting response.     3640  Message sent to the schedulers informing of hospfu appts with [facility] neuro & cards at [facility]. Awaiting response.       Will continue to follow.

## 2024-12-03 NOTE — PT/OT/SLP PROGRESS
"Physical Therapy Treatment    Patient Name:  Aurora Go   MRN:  [id number]    Recommendations:     Discharge Recommendations: Low Intensity Therapy (home health PT/OT)  Discharge Equipment Recommendations: bedside commode, walker, rolling (patient is 4'11" and the equipment she has access to is from her  and is not suited to her size, therefore will need her own equipment)    The mobility limitation cannot be sufficiently resolved by the use of a cane.   Patient's functional mobility deficit can be sufficiently resolved with the use of a rolling walker. Patient's mobility limitation significantly impairs their ability to participate in one of more activities of daily living. The use of a rolling walker will significantly improve the patient's ability to participate in MRADLS and the patient will use it on regular basis in the home.      This patient would BENEFIT  from a bedside commode, because they are minimally ambulatory and lack the endurance due to their current medical diagnosis, to ambulate  as far as required to their usual toilet facility, and to maintain safety at night time with this ADL.    Barriers to discharge: Decreased caregiver support and decreased safety/mobility    Assessment:     Aurora Go is a 83 y.o. female admitted with a medical diagnosis of TIA (transient ischemic attack).  She presents with the following impairments/functional limitations: weakness, impaired endurance, impaired functional mobility, gait instability, impaired balance, impaired self care skills Patient reports no lightheadedness or dizziness throughout session; pt reports LEs feeling weak as if to give way, and at times has painful knees; pt performed sup to sit with SBA; sit to stand with SBA/CGA using RW- pt amb ~20ft using RW with SBA/CGA; without RW, pt is unsafe with decreased fluidity of movement and reaching out for furniture for support; will recommend low intensity therapy (home health PT/OT, " BSC for use at night at bedside, and RW for safety with maneuvering in home); will cont with POC. .    Rehab Prognosis: Good; patient would benefit from acute skilled PT services to address these deficits and reach maximum level of function.    Recent Surgery: * No surgery found *      Plan:     During this hospitalization, patient to be seen 3 x/week to address the identified rehab impairments via gait training, therapeutic activities, therapeutic exercises, neuromuscular re-education and progress toward the following goals:    Plan of Care Expires:  01/02/25    Subjective     Chief Complaint: reports she is no longer experiencing dizziness with movement; RLE give her some problems moreso than LLE with feeling weak/like giving way  Patient/Family Comments/goals: to return home  Pain/Comfort:  Pain Rating 1: 0/10  Pain Rating Post-Intervention 1: 0/10      Objective:     Communicated with nurse prior to session.  Patient found HOB elevated with bed alarm, telemetry upon PT entry to room.     General Precautions: Standard, fall  Orthopedic Precautions: N/A  Braces: N/A  Respiratory Status: Room air     Functional Mobility:  Bed Mobility:     Scooting: stand by assistance  Supine to Sit: stand by assistance  Transfers:     Sit to Stand:  stand by assistance and contact guard assistance with rolling walker and CGA without RW; patient trying to reach for support and has a slight ant/post   Bed to Chair: stand by assistance and contact guard assistance with  rolling walker  using  Step Transfer  Gait: Patient initially tried to amb without RW- pt was reaching out for furniture for support with increased lateral sway bilaterally with effortful movement few steps forward and back; pt then stepped in place prior to amb in room to door and back~20ft using RW with SBA/CGA; improved fluidity of movement using RW, improved balance; short step length initially with 3 pt gait, slow evon, then progressed to amb with reciprocal  gait toward last 6ft   Balance: dynamic fair- to fair without RW, fair to fair+ with RW      AM-PAC 6 CLICK MOBILITY  Turning over in bed (including adjusting bedclothes, sheets and blankets)?: 3  Sitting down on and standing up from a chair with arms (e.g., wheelchair, bedside commode, etc.): 3  Moving from lying on back to sitting on the side of the bed?: 3  Moving to and from a bed to a chair (including a wheelchair)?: 3  Need to walk in hospital room?: 3  Climbing 3-5 steps with a railing?: 3  Basic Mobility Total Score: 18       Treatment & Education:  -granddaughter present in room for treatment session  -pt reports no longer has dizziness with mobility  -pt instructed on home safety for mobility with performing active exercise before standing and assessing self prior to amb for lightheadedness/dizziness, use of RW for increased safety, use of BSC at night instead of trying to amb to bathroom at night; discussed home health to increase activity and overall strength- pt gave good verbal understanding of education topics  -pt performed activities as described above; VCs for technique for gait mechanics and sit <>stand with hand placement and performing slow transitions  -pt sat in chair and instructed in BLE/BUE AROM ex to be done intermittently throughout the day; pt reports that she if familiar with all the ex and how to move 2/2 her  had home health and she had to learn to help him    Patient left up in chair with all lines intact, call button in reach, nurse notified, and granddaughter present..    GOALS:   Multidisciplinary Problems       Physical Therapy Goals          Problem: Physical Therapy    Goal Priority Disciplines Outcome Interventions   Physical Therapy Goal     PT, PT/OT Progressing    Description: Goals to be met by: 2024     Patient will increase functional independence with mobility by performin. Supine to sit with Modified [place]  2. Sit to supine with Modified  [place]  3. Rolling with Modified [place].  4. Sit to stand transfer with Modified [place] with or without Single-point Cane  5. Bed to chair transfer with Modified [place] with or without Single-point Cane   6. Gait  x 80 feet with Modified [place] with or without Single-point Cane .                          Time Tracking:     PT Received On: 12/03/24  PT Start Time: 1053     PT Stop Time: 1140  PT Total Time (min): 47 min     Billable Minutes: Gait Training 10 and Therapeutic Activity 10 Self Care 20    Treatment Type: Treatment  PT/PTA: PT     Number of PTA visits since last PT visit: 0     12/03/2024

## 2024-12-03 NOTE — PLAN OF CARE
Virtual Nurse note: Patient chart, labs, and vitals reviewed. Care plan and goals updated as needed.   VN to continue to be available as needed.   Problem: Adult Inpatient Plan of Care  Goal: Plan of Care Review  Outcome: Progressing  Goal: Patient-Specific Goal (Individualized)  Outcome: Progressing

## 2024-12-03 NOTE — PLAN OF CARE
Problem: Adult Inpatient Plan of Care  Goal: Plan of Care Review  Outcome: Progressing  Goal: Patient-Specific Goal (Individualized)  Outcome: Progressing  Goal: Absence of Hospital-Acquired Illness or Injury  Outcome: Progressing  Goal: Optimal Comfort and Wellbeing  Outcome: Progressing   POC reviewed with pt. A&Ox4. Room air. No acute changes. Up with assist. BG monitored. Safety checks performed.

## 2024-12-03 NOTE — PLAN OF CARE
Inpatient Upgrade Note    Aurora Go has warranted treatment spanning two or more midnights of hospital level care for the management of TIA/Stroke,Dizziness. She continues to require further testing/imaging, monitoring of vital signs, and medication adjustments. Her condition is also complicated by the following comorbidities: Hypertension, Diabetes, and prior Stroke

## 2024-12-03 NOTE — SUBJECTIVE & OBJECTIVE
Interval History:     I have seen the patient Examined her today  Her granddaughter was at bedside  Patient reports feels much better and wanting with PT    CTA head and neck, carotid ultrasound was reviewed, discussed with Neurology and recommended outpatient Neurology and IR neuro follow up    Review of Systems   Constitutional: Negative.    HENT: Negative.     Respiratory: Negative.     Cardiovascular: Negative.    Gastrointestinal: Negative.    Genitourinary: Negative.    Musculoskeletal: Negative.    Skin: Negative.    Neurological: Negative.    Psychiatric/Behavioral: Negative.       Objective:     Vital Signs (Most Recent):  Temp: 98 °F (36.7 °C) (12/03/24 1114)  Pulse: 97 (12/03/24 1208)  Resp: 18 (12/03/24 1114)  BP: 135/74 (12/03/24 1114)  SpO2: 97 % (12/03/24 1338) Vital Signs (24h Range):  Temp:  [97.8 °F (36.6 °C)-98.1 °F (36.7 °C)] 98 °F (36.7 °C)  Pulse:  [80-99] 97  Resp:  [16-20] 18  SpO2:  [96 %-99 %] 97 %  BP: ()/(61-76) 135/74     Weight: 102.5 kg (226 lb)  Body mass index is 45.65 kg/m².    Intake/Output Summary (Last 24 hours) at 12/3/2024 1556  Last data filed at 12/3/2024 0830  Gross per 24 hour   Intake --   Output 1550 ml   Net -1550 ml         Physical Exam  Constitutional:       Appearance: She is obese.   Cardiovascular:      Rate and Rhythm: Normal rate and regular rhythm.   Pulmonary:      Effort: Pulmonary effort is normal. No respiratory distress.      Breath sounds: Normal breath sounds.   Abdominal:      General: Abdomen is flat.      Palpations: Abdomen is soft.   Skin:     General: Skin is warm and dry.   Neurological:      General: No focal deficit present.      Mental Status: She is alert and oriented to person, place, and time. Mental status is at baseline.             Significant Labs: All pertinent labs within the past 24 hours have been reviewed.  CBC:   Recent Labs   Lab 12/02/24  0213 12/03/24  0219   WBC 8.57 7.74   HGB 12.8 13.7   HCT 39.3 41.9    230      CMP:   Recent Labs   Lab 12/01/24 2004 12/02/24 0213 12/03/24 0219   NA  --  140 137   K  --  4.0 3.8   CL  --  107 104   CO2  --  22* 21*   GLU  --  101 126*   BUN  --  21 24*   CREATININE 1.1 1.0 1.2   CALCIUM  --  9.2 9.0   PROT  --  6.7 7.0   ALBUMIN  --  3.1* 3.3*   BILITOT  --  0.6 0.5   ALKPHOS  --  81 82   AST  --  16 16   ALT  --  8* 11   ANIONGAP  --  11 12       Significant Imaging: I have reviewed all pertinent imaging results/findings within the past 24 hours.

## 2024-12-04 VITALS
OXYGEN SATURATION: 96 % | HEIGHT: 59 IN | TEMPERATURE: 98 F | WEIGHT: 226 LBS | RESPIRATION RATE: 16 BRPM | BODY MASS INDEX: 45.56 KG/M2 | SYSTOLIC BLOOD PRESSURE: 115 MMHG | HEART RATE: 79 BPM | DIASTOLIC BLOOD PRESSURE: 59 MMHG

## 2024-12-04 LAB — POCT GLUCOSE: 139 MG/DL (ref 70–110)

## 2024-12-04 PROCEDURE — 25000003 PHARM REV CODE 250: Performed by: STUDENT IN AN ORGANIZED HEALTH CARE EDUCATION/TRAINING PROGRAM

## 2024-12-04 PROCEDURE — 25000003 PHARM REV CODE 250: Performed by: INTERNAL MEDICINE

## 2024-12-04 RX ORDER — LISINOPRIL 20 MG/1
20 TABLET ORAL DAILY
Qty: 90 TABLET | Refills: 3 | Status: SHIPPED | OUTPATIENT
Start: 2024-12-04 | End: 2025-12-04

## 2024-12-04 RX ORDER — LEVOTHYROXINE SODIUM 100 UG/1
100 TABLET ORAL DAILY
Qty: 30 TABLET | Refills: 0 | Status: SHIPPED | OUTPATIENT
Start: 2024-12-04 | End: 2025-01-03

## 2024-12-04 RX ORDER — LEVOTHYROXINE SODIUM 125 UG/1
1 TABLET ORAL DAILY
Status: ON HOLD | COMMUNITY
Start: 2024-12-02 | End: 2024-12-04

## 2024-12-04 RX ORDER — ATORVASTATIN CALCIUM 40 MG/1
40 TABLET, FILM COATED ORAL DAILY
Qty: 30 TABLET | Refills: 9 | Status: SHIPPED | OUTPATIENT
Start: 2024-12-04

## 2024-12-04 RX ORDER — RAMIPRIL 5 MG/1
5 CAPSULE ORAL DAILY
Qty: 30 CAPSULE | Refills: 0 | Status: CANCELLED | OUTPATIENT
Start: 2024-12-04

## 2024-12-04 RX ORDER — CARBOXYMETHYLCELLULOSE SODIUM 5 MG/ML
1 SOLUTION/ DROPS OPHTHALMIC 2 TIMES DAILY
Qty: 30 ML | Refills: 0 | Status: SHIPPED | OUTPATIENT
Start: 2024-12-04

## 2024-12-04 RX ORDER — LEVOTHYROXINE SODIUM 150 UG/1
150 TABLET ORAL DAILY
Qty: 30 TABLET | Refills: 0 | Status: CANCELLED | OUTPATIENT
Start: 2024-12-04

## 2024-12-04 RX ORDER — RAMIPRIL 2.5 MG/1
1 CAPSULE ORAL DAILY
Status: ON HOLD | COMMUNITY
Start: 2024-08-12 | End: 2024-12-04 | Stop reason: HOSPADM

## 2024-12-04 RX ORDER — CLOPIDOGREL BISULFATE 75 MG/1
75 TABLET ORAL DAILY
Qty: 30 TABLET | Refills: 11 | Status: SHIPPED | OUTPATIENT
Start: 2024-12-04 | End: 2025-12-04

## 2024-12-04 RX ADMIN — LISINOPRIL 5 MG: 5 TABLET ORAL at 08:12

## 2024-12-04 RX ADMIN — ATORVASTATIN CALCIUM 40 MG: 40 TABLET, FILM COATED ORAL at 08:12

## 2024-12-04 RX ADMIN — CLOPIDOGREL BISULFATE 75 MG: 75 TABLET ORAL at 08:12

## 2024-12-04 RX ADMIN — LEVOTHYROXINE SODIUM 150 MCG: 75 TABLET ORAL at 05:12

## 2024-12-04 RX ADMIN — HYDROCHLOROTHIAZIDE 25 MG: 25 TABLET ORAL at 08:12

## 2024-12-04 NOTE — PLAN OF CARE
"0800  CHYNA was informed by Fausto ([phone]) w/[facility] that the pt has been accepted. CHYNA informed Fausto that the pt will dc home today. Awaiting HH orders.     0830  CHYNA received a call from the pt's daughter, Ema [phone]), requesting assistance with transportation for the pt. CHYNA received confirmation from CM Supervisor Isadora that the [facility] can transport the pt at time of discharge. CHYNA informed Ema of above & that [facility] will provide HH services. Ema verbalized understanding, agreement, appreciation, & stated she would home to accept the pt. Phone consent obtained for "Pt Choice" form to be placed in the pt's chart.     Eileen also stated that the pt has a RW at home & doesn't need another.        12/04/24 0850   Rounds   Attendance Provider;Nurse ;Assigned nurse;Pharmacist   Discharge Plan A Home Health   Transition of Care Barriers Transportation       0850  DC order noted. Patient resting quietly in bed when CHYNA participated in SIBR with Dr Maurer, pharmacist Duane, & nrchintan Mitchell. No family present. Patient in agreement with plan to discharge home with [facility] today, verbalized understanding that the [facility] will provide transportation at time of discharge, verbalized understanding regarding the hospital follow up appointment with Dr Gamez (PCP) on 12/17/2024 at 1030, & daughter will be notified of hospfu appts with neuro, card, & vasc neuro.     Message sent to the schedulers requesting a neuro hospfu appt with Dr Wise at [facility]. Awaiting response.     CHYNA informed CM Supervisor Isadora of [facility] Transportation needed with a requested pickup at 1030. Transportation packet left at the nurse's station.     0900  Message sent to nurse Mitchell, charge nurse Anahi, & virtual nurse Jeannie informing that the pt is cleared to discharge, of transportation scheduled, & requesting that Paula notify Ema when the pt leaves [employer]. "     1230   orders manually faxed to [facility] (f [phone]).       Will continue to follow.

## 2024-12-04 NOTE — ASSESSMENT & PLAN NOTE
Antithrombotics for secondary stroke prevention: Antiplatelets: Clopidogrel: 75 mg daily as recommended by Neurology    Statins for secondary stroke prevention and hyperlipidemia, if present:   Statins: Atorvastatin- 40 mg daily    Aggressive risk factor modification: HTN, Obesity     Rehab efforts: The patient has been evaluated by a stroke team provider and the therapy needs have been fully considered based off the presenting complaints and exam findings. The following therapy evaluations are needed: PT evaluate and treat, OT evaluate and treat    Diagnostics ordered/pending: MRI head without contrast to assess brain parenchyma    VTE prophylaxis: Enoxaparin 40 mg SQ every 24 hours    BP parameters: TIA: SBP <220 until imaging confirmation of no infarct

## 2024-12-04 NOTE — NURSING
[facility]el[facility] van transport at bedside to  patient. AVS reviewed by VN. Medications to be picked up at outside pharmacy. Tele and PIV discontinued. Crystal notified of discharge.

## 2024-12-04 NOTE — PLAN OF CARE
[place] - Telemetry      HOME HEALTH ORDERS  FACE TO FACE ENCOUNTER    Patient Name: Aurora Go  YOB: 1940    PCP: Clay Gamez MD   PCP Address: [address] [facility] / [address]  PCP Phone Number: [phone]  PCP Fax: [phone]    Encounter Date: 12/1/24    Admit to Home Health    Diagnoses:  Active Hospital Problems    Diagnosis  POA    *TIA (transient ischemic attack) [G45.9]  Yes    Internal carotid aneurysm [I67.1]  Yes    More than 50 percent stenosis of left internal carotid artery [I65.22]  Yes    Hypothyroid [E03.9]  Yes    Hyperlipidemia [E78.5]  Yes    Hypertension goal BP (blood pressure) < 130/80 [I10]  Yes    Class 3 severe obesity due to excess calories in adult [E66.813, E66.01]  Yes      Resolved Hospital Problems   No resolved problems to display.       Follow Up Appointments:  No future appointments.    Allergies:Review of patient's allergies indicates:  No Known Allergies    Medications: Review discharge medications with patient and family and provide education.    Current Facility-Administered Medications   Medication Dose Route Frequency Provider Last Rate Last Admin    atorvastatin tablet 40 mg  40 mg Oral Daily Jamie Mckinnon MD   40 mg at 12/04/24 0825    bisacodyL suppository 10 mg  10 mg Rectal Daily PRN Jamie Mckinnon MD        clopidogreL tablet 75 mg  75 mg Oral Daily Marianne Mirza MD   75 mg at 12/04/24 0825    dextrose 10% bolus 125 mL 125 mL  12.5 g Intravenous PRN Marianne Mirza MD        dextrose 10% bolus 250 mL 250 mL  25 g Intravenous PRN Marianne Mirza MD        enoxaparin injection 40 mg  40 mg Subcutaneous Daily Liset Rivera MD   40 mg at 12/03/24 1655    glucagon (human recombinant) injection 1 mg  1 mg Intramuscular PRN Marianne Mirza MD        glucose chewable tablet 16 g  16 g Oral PRN Marianne Mirza MD        glucose chewable tablet 24 g  24 g Oral PRN  Marianne Mirza MD        hydroCHLOROthiazide tablet 25 mg  25 mg Oral Daily Alba Andujar MD   25 mg at 12/04/24 0825    insulin aspart U-100 pen 0-5 Units  0-5 Units Subcutaneous QID (AC + HS) PRN Marianne Mirza MD        levothyroxine tablet 150 mcg  150 mcg Oral Before breakfast Alba Andujar MD   150 mcg at 12/04/24 0521    lisinopriL tablet 5 mg  5 mg Oral Daily Alba Andujar MD   5 mg at 12/04/24 0825    miconazole NITRATE 2 % top powder   Topical (Top) BID Marianne Mirza MD   Given at 12/03/24 2009    sodium chloride 0.9% flush 10 mL  10 mL Intravenous PRN Jamie Mckinnon MD         Current Outpatient Medications   Medication Sig Dispense Refill    atorvastatin (LIPITOR) 40 MG tablet Take 1 tablet (40 mg total) by mouth once daily. 30 tablet 9    carboxymethylcellulose sodium (LUBRICANT EYE DROPS) 0.5 % Drop Apply 1 drop to eye 2 (two) times a day. 30 mL 0    clopidogreL (PLAVIX) 75 mg tablet Take 1 tablet (75 mg total) by mouth once daily. 30 tablet 11    levothyroxine (SYNTHROID) 100 MCG tablet Take 1 tablet (100 mcg total) by mouth once daily. 30 tablet 0    lisinopriL (PRINIVIL,ZESTRIL) 20 MG tablet Take 1 tablet (20 mg total) by mouth once daily. 90 tablet 3        Medication List        START taking these medications      carboxymethylcellulose sodium 0.5 % Drop  Commonly known as: LUBRICANT EYE DROPS  Apply 1 drop to eye 2 (two) times a day.     clopidogreL 75 mg tablet  Commonly known as: PLAVIX  Take 1 tablet (75 mg total) by mouth once daily.     lisinopriL 20 MG tablet  Commonly known as: PRINIVIL,ZESTRIL  Take 1 tablet (20 mg total) by mouth once daily.            CHANGE how you take these medications      levothyroxine 100 MCG tablet  Commonly known as: SYNTHROID  Take 1 tablet (100 mcg total) by mouth once daily.  What changed:   medication strength  how much to take  when to take this            CONTINUE taking these medications      atorvastatin 40 MG  tablet  Commonly known as: LIPITOR  Take 1 tablet (40 mg total) by mouth once daily.            STOP taking these medications      aspirin 81 MG EC tablet  Commonly known as: ECOTRIN     glyBURIDE 1.25 MG Tab  Commonly known as: DIABETA     hydroCHLOROthiazide 25 MG tablet  Commonly known as: HYDRODIURIL     ramipriL 2.5 MG capsule  Commonly known as: ALTACE     VITAMIN B-6 50 MG tablet  Generic drug: pyridoxine (vitamin B6)                I have seen and examined this patient within the last 30 days. My clinical findings that support the need for the home health skilled services and home bound status are the following:no   Weakness/numbness causing balance and gait disturbance due to age and obesity making it taxing to leave home.     Diet:   diabetic diet 2000 calorie and 2 gram sodium diet      Referrals/ Consults  Physical Therapy to evaluate and treat. Evaluate for home safety and equipment needs; Establish/upgrade home exercise program. Perform / instruct on therapeutic exercises, gait training, transfer training, and Range of Motion.  Occupational Therapy to evaluate and treat. Evaluate home environment for safety and equipment needs. Perform/Instruct on transfers, ADL training, ROM, and therapeutic exercises.   to evaluate for community resources/long-range planning.  Aide to provide assistance with personal care, ADLs, and vital signs.    Activities:   activity as tolerated    Nursing:   Agency to admit patient within 24 hours of hospital discharge unless specified on physician order or at patient request    SN to complete comprehensive assessment including routine vital signs. Instruct on disease process and s/s of complications to report to MD. Review/verify medication list sent home with the patient at time of discharge  and instruct patient/caregiver as needed. Frequency may be adjusted depending on start of care date.     Skilled nurse to perform up to 3 visits PRN for symptoms related to  diagnosis    Notify MD if SBP > 160 or < 90; DBP > 90 or < 50; HR > 120 or < 50; Temp > 101; O2 < 88%;   Ok to schedule additional visits based on staff availability and patient request on consecutive days within the home health episode.    When multiple disciplines ordered:    Start of Care occurs on Sunday - Wednesday schedule remaining discipline evaluations as ordered on separate consecutive days following the start of care.    Thursday SOC -schedule subsequent evaluations Friday and Monday the following week.     Friday - Saturday SOC - schedule subsequent discipline evaluations on consecutive days starting Monday of the following week.    Home Health Aide:  Physical Therapy Three times weekly, Occupational Therapy Three times weekly, and Home Health Aide Three times weekly    Wound Care Orders  no    I certify that this patient is confined to her home and needs intermittent skilled nursing care.

## 2024-12-04 NOTE — ASSESSMENT & PLAN NOTE
Synthroid Dose was decreased to 100 mcg, patient needs to follow up with PCP to repeat TSH in 4-6 weeks

## 2024-12-04 NOTE — HOSPITAL COURSE
Patient was admitted for dizziness, neuro evaluated the patient, CTA showed left internal carotid stenosis of around 50%, patient is referred for outpatient neurovascular to follow up, imaging also showed distal left internal carotid aneurysm of 3 mm, patient is referred to neuro IR as per recommendation by neuro team for follow up    Medication were adjusted, hydrochlorothiazide and glyburide where discontinued , given we will control blood pressure and glucose for her age    Based on her recent TSH, Synthroid dose was decreased from 125 to 100 mcg on discharge    Patient is advised to follow up with neuro and cardio as scheduled    PT evaluated the patient and recommended home health and patient has DME

## 2024-12-04 NOTE — ASSESSMENT & PLAN NOTE
Patients blood pressure range in the last 24 hours was: BP  Min: 103/55  Max: 123/60.The patient's inpatient anti-hypertensive regimen is listed below:  Current Antihypertensives  hydroCHLOROthiazide tablet 25 mg, Daily, Oral  lisinopriL tablet 5 mg, Daily, Oral  lisinopril (PRINIVIL,ZESTRIL) tablet, Daily, Oral  On discharge we will hold hydrochlorothiazide and we will continue lisinopril 20 mg

## 2024-12-04 NOTE — ASSESSMENT & PLAN NOTE
CTA with left internal carotid artery aneurysm 3 mm  Discussed with neuro recommended outpatient neuroIR follow up

## 2024-12-04 NOTE — PLAN OF CARE
Introduced as VN and will be reviewing discharge instructions.  Educated patient on reason for admission, home medication list, and discharge instructions including when to return to ED and the following doctor appointments.  Education per flowsheet.  Opportunity given for questions and questions answered. Nurse   notified of   completion of discharge education.  Patient is waiting for transport

## 2024-12-04 NOTE — DISCHARGE SUMMARY
[facility] Medicine  Discharge Summary      Patient Name: Aurora Go  MRN: [id number]  SAL: [id number]  Patient Class: IP- Inpatient  Admission Date: 12/1/2024  Hospital Length of Stay: 3 days  Discharge Date and Time:  12/04/2024 11:41 AM  Attending Physician: No att. providers found   Discharging Provider: Brent Maurer MD  Primary Care Provider: Clay Gamez MD    Primary Care Team: Networked reference to record PCT     HPI:   Aurora Go is a 83 yr old with a PMH of hypertension, diabetes who presents with dizziness, ataxia and associated nausea and vomiting at outside facility.    Patient was states earlier in the day, she awoke with dizziness, ataxia, legs feeling heavy associated nausea and vomiting.  She denied any facial droop, sensory changes.  She went to the ED for further evaluation.    Chest x-ray without signs of consolidation. CT of the head was negative at that time.    Patient was given aspirin.  Neurology was consulted with recommendations to transferred to [facility] for neurological consultation.    Upon arrival to [facility], triage vitals were fairly unremarkable.  Review of systems significant for dizziness.  Physical examination    Patient was admitted for possible TIA versus stroke.    * No surgery found *      Hospital Course:   Patient was admitted for dizziness, neuro evaluated the patient, CTA showed left internal carotid stenosis of around 50%, patient is referred for outpatient neurovascular to follow up, imaging also showed distal left internal carotid aneurysm of 3 mm, patient is referred to neuro IR as per recommendation by neuro team for follow up    Medication were adjusted, hydrochlorothiazide and glyburide where discontinued , given we will control blood pressure and glucose for her age    Based on her recent TSH, Synthroid dose was decreased from 125 to 100 mcg on discharge    Patient is advised to follow up with neuro and cardio as  scheduled    PT evaluated the patient and recommended home health and patient has DME     Goals of Care Treatment Preferences:  Code Status: Full Code      SDOH Screening:  The patient was screened for utility difficulties, food insecurity, transport difficulties, housing insecurity, and interpersonal safety and there were no concerns identified this admission.     Consults:   Consults (From admission, onward)          Status Ordering Provider     Inpatient consult to [facility]  Once        Provider:  (Not yet assigned)    Completed RAVINDRA COSTELLO     IP consult to case management/social work  Once        Provider:  (Not yet assigned)    Completed COREY FIGUEROA            * TIA (transient ischemic attack)    Antithrombotics for secondary stroke prevention: Antiplatelets: Clopidogrel: 75 mg daily as recommended by Neurology    Statins for secondary stroke prevention and hyperlipidemia, if present:   Statins: Atorvastatin- 40 mg daily    Aggressive risk factor modification: HTN, Obesity     Rehab efforts: The patient has been evaluated by a stroke team provider and the therapy needs have been fully considered based off the presenting complaints and exam findings. 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 Morbid obesity complicates all aspects of disease management from diagnostic modalities to treatment. Weight loss encouraged and health benefits explained to patient.         Hypertension goal BP (blood pressure) < 130/80  Patients blood pressure range in the last 24 hours was: BP  Min: 103/55  Max: 123/60.The patient's inpatient anti-hypertensive regimen is listed below:  Current Antihypertensives  hydroCHLOROthiazide tablet 25 mg, Daily, Oral  lisinopriL tablet 5 mg, Daily, Oral  lisinopril (PRINIVIL,ZESTRIL) tablet, Daily, Oral  On discharge we will hold hydrochlorothiazide and we will continue lisinopril 20 mg    Hyperlipidemia    Plan:  Continue with statin      Final Active Diagnoses:    Diagnosis Date Noted POA    PRINCIPAL PROBLEM:  TIA (transient ischemic attack) [G45.9] 12/02/2024 Yes    Internal carotid aneurysm [I67.1] 12/03/2024 Yes    More than 50 percent stenosis of left internal carotid artery [I65.22] 12/03/2024 Yes    Hypothyroid [E03.9] 06/05/2019 Yes    Hyperlipidemia [E78.5] 10/05/2012 Yes    Hypertension goal BP (blood pressure) < 130/80 [I10] 10/05/2012 Yes    Class 3 severe obesity due to excess calories in adult [E66.813, E66.01] 10/05/2012 Yes      Problems Resolved During this Admission:       Discharged Condition: good    Disposition: Home or Self Care    Follow Up:   Follow-up Information       Clay Gamez MD Follow up on 12/17/2024.    Specialty: Family Medicine  Why: at 10:30 AM; PCP hospital follow up appointment  Contact information:  [address] [facility]  [place] [address]  [phone]               [facility] VASCULAR NEUROLOGY Follow up.    Specialty: Vascular Neurology  Why: Patient will be notified of a [facility] neurology hospital follow up appointment  Contact information:  [id number]  [facility] [id number] [phone]             Evan Gamino - Cardiology - [facility] Follow up.    Specialty: Cardiology  Why: Patient  will be notified of a cardiology hospital follow up appointment  Contact information:  [address]  [facility] [address] [phone]  Additional information:  Cardiology Services Clinics - 3rd floor                         Patient Instructions:   No discharge procedures on file.    Significant Diagnostic Studies: Labs: CMP   Recent Labs   Lab 12/03/24  0219      K 3.8      CO2 21*   *   BUN 24*   CREATININE 1.2   CALCIUM 9.0   PROT 7.0   ALBUMIN 3.3*   BILITOT 0.5   ALKPHOS 82   AST 16   ALT 11   ANIONGAP 12    and CBC   Recent Labs   Lab 12/03/24  0219   WBC 7.74   HGB 13.7   HCT 41.9          Pending Diagnostic Studies:       Procedure Component Value Units Date/Time    Echo Saline Bubble? Yes [[id number]]     Order Status: Sent Lab Status: No result            Medications:  Reconciled Home Medications:      Medication List        START taking these medications      carboxymethylcellulose sodium 0.5 % Drop  Commonly known as: LUBRICANT EYE DROPS  Apply 1 drop to eye 2 (two) times a day.     clopidogreL 75 mg tablet  Commonly known as: PLAVIX  Take 1 tablet (75 mg total) by mouth once daily.     lisinopriL 20 MG tablet  Commonly known as: PRINIVIL,ZESTRIL  Take 1 tablet (20 mg total) by mouth once daily.            CHANGE how you take these medications      levothyroxine 100 MCG tablet  Commonly known as: SYNTHROID  Take 1 tablet (100 mcg total) by mouth once daily.  What changed:   medication strength  how much to take  when to take this            CONTINUE taking these medications      atorvastatin 40 MG tablet  Commonly known as: LIPITOR  Take 1 tablet (40 mg total) by mouth once daily.            STOP taking these medications      aspirin 81 MG EC tablet  Commonly known as: ECOTRIN     glyBURIDE 1.25 MG Tab  Commonly known as: DIABETA     hydroCHLOROthiazide 25 MG tablet  Commonly known as: HYDRODIURIL     ramipriL 2.5 MG capsule  Commonly known as: ALTACE      VITAMIN B-6 50 MG tablet  Generic drug: pyridoxine (vitamin B6)              Indwelling Lines/Drains at time of discharge:   Lines/Drains/Airways       None                   Time spent on the discharge of patient: 35 minutes         Brent Maurer MD  Department of Hospital Medicine  [place] [facility]